# Patient Record
Sex: MALE | Race: WHITE | NOT HISPANIC OR LATINO | URBAN - METROPOLITAN AREA
[De-identification: names, ages, dates, MRNs, and addresses within clinical notes are randomized per-mention and may not be internally consistent; named-entity substitution may affect disease eponyms.]

---

## 2019-06-10 ENCOUNTER — INPATIENT (INPATIENT)
Facility: HOSPITAL | Age: 59
LOS: 3 days | Discharge: HOME | DRG: 166 | End: 2019-06-14
Payer: COMMERCIAL

## 2019-06-10 VITALS
HEART RATE: 91 BPM | DIASTOLIC BLOOD PRESSURE: 88 MMHG | RESPIRATION RATE: 16 BRPM | SYSTOLIC BLOOD PRESSURE: 144 MMHG | OXYGEN SATURATION: 90 % | TEMPERATURE: 100 F

## 2019-06-10 DIAGNOSIS — E66.2 MORBID (SEVERE) OBESITY WITH ALVEOLAR HYPOVENTILATION: ICD-10-CM

## 2019-06-10 DIAGNOSIS — E78.5 HYPERLIPIDEMIA, UNSPECIFIED: ICD-10-CM

## 2019-06-10 DIAGNOSIS — Z99.89 DEPENDENCE ON OTHER ENABLING MACHINES AND DEVICES: ICD-10-CM

## 2019-06-10 DIAGNOSIS — R19.7 DIARRHEA, UNSPECIFIED: ICD-10-CM

## 2019-06-10 DIAGNOSIS — R06.02 SHORTNESS OF BREATH: ICD-10-CM

## 2019-06-10 DIAGNOSIS — M79.602 PAIN IN LEFT ARM: ICD-10-CM

## 2019-06-10 DIAGNOSIS — Z98.890 OTHER SPECIFIED POSTPROCEDURAL STATES: Chronic | ICD-10-CM

## 2019-06-10 DIAGNOSIS — L40.50 ARTHROPATHIC PSORIASIS, UNSPECIFIED: ICD-10-CM

## 2019-06-10 DIAGNOSIS — Z91.89 OTHER SPECIFIED PERSONAL RISK FACTORS, NOT ELSEWHERE CLASSIFIED: ICD-10-CM

## 2019-06-10 DIAGNOSIS — J96.01 ACUTE RESPIRATORY FAILURE WITH HYPOXIA: ICD-10-CM

## 2019-06-10 DIAGNOSIS — G47.33 OBSTRUCTIVE SLEEP APNEA (ADULT) (PEDIATRIC): ICD-10-CM

## 2019-06-10 DIAGNOSIS — K27.9 PEPTIC ULCER, SITE UNSPECIFIED, UNSPECIFIED AS ACUTE OR CHRONIC, WITHOUT HEMORRHAGE OR PERFORATION: ICD-10-CM

## 2019-06-10 DIAGNOSIS — G47.00 INSOMNIA, UNSPECIFIED: ICD-10-CM

## 2019-06-10 DIAGNOSIS — F32.9 MAJOR DEPRESSIVE DISORDER, SINGLE EPISODE, UNSPECIFIED: ICD-10-CM

## 2019-06-10 DIAGNOSIS — Z29.9 ENCOUNTER FOR PROPHYLACTIC MEASURES, UNSPECIFIED: ICD-10-CM

## 2019-06-10 DIAGNOSIS — R63.8 OTHER SYMPTOMS AND SIGNS CONCERNING FOOD AND FLUID INTAKE: ICD-10-CM

## 2019-06-10 DIAGNOSIS — J67.8 HYPERSENSITIVITY PNEUMONITIS DUE TO OTHER ORGANIC DUSTS: ICD-10-CM

## 2019-06-10 DIAGNOSIS — E87.3 ALKALOSIS: ICD-10-CM

## 2019-06-10 DIAGNOSIS — R00.2 PALPITATIONS: ICD-10-CM

## 2019-06-10 DIAGNOSIS — B34.9 VIRAL INFECTION, UNSPECIFIED: ICD-10-CM

## 2019-06-10 LAB
ALBUMIN SERPL ELPH-MCNC: 4.1 G/DL — SIGNIFICANT CHANGE UP (ref 3.3–5)
ALP SERPL-CCNC: 99 U/L — SIGNIFICANT CHANGE UP (ref 40–120)
ALT FLD-CCNC: 56 U/L — HIGH (ref 10–45)
ANION GAP SERPL CALC-SCNC: 13 MMOL/L — SIGNIFICANT CHANGE UP (ref 5–17)
APTT BLD: 30.4 SEC — SIGNIFICANT CHANGE UP (ref 27.5–36.3)
AST SERPL-CCNC: 39 U/L — SIGNIFICANT CHANGE UP (ref 10–40)
BASE EXCESS BLDA CALC-SCNC: 1.8 MMOL/L — SIGNIFICANT CHANGE UP (ref -2–3)
BASOPHILS # BLD AUTO: 0.02 K/UL — SIGNIFICANT CHANGE UP (ref 0–0.2)
BASOPHILS NFR BLD AUTO: 0.4 % — SIGNIFICANT CHANGE UP (ref 0–2)
BILIRUB SERPL-MCNC: 0.7 MG/DL — SIGNIFICANT CHANGE UP (ref 0.2–1.2)
BUN SERPL-MCNC: 12 MG/DL — SIGNIFICANT CHANGE UP (ref 7–23)
CALCIUM SERPL-MCNC: 9 MG/DL — SIGNIFICANT CHANGE UP (ref 8.4–10.5)
CHLORIDE SERPL-SCNC: 103 MMOL/L — SIGNIFICANT CHANGE UP (ref 96–108)
CK MB CFR SERPL CALC: <1 NG/ML — SIGNIFICANT CHANGE UP (ref 0–6.7)
CO2 SERPL-SCNC: 23 MMOL/L — SIGNIFICANT CHANGE UP (ref 22–31)
CREAT SERPL-MCNC: 0.74 MG/DL — SIGNIFICANT CHANGE UP (ref 0.5–1.3)
EOSINOPHIL # BLD AUTO: 0.12 K/UL — SIGNIFICANT CHANGE UP (ref 0–0.5)
EOSINOPHIL NFR BLD AUTO: 2.3 % — SIGNIFICANT CHANGE UP (ref 0–6)
GAS PNL BLDV: SIGNIFICANT CHANGE UP
GLUCOSE SERPL-MCNC: 119 MG/DL — HIGH (ref 70–99)
HCO3 BLDA-SCNC: 25 MMOL/L — SIGNIFICANT CHANGE UP (ref 21–28)
HCT VFR BLD CALC: 44.8 % — SIGNIFICANT CHANGE UP (ref 39–50)
HGB BLD-MCNC: 15.6 G/DL — SIGNIFICANT CHANGE UP (ref 13–17)
IMM GRANULOCYTES NFR BLD AUTO: 0.4 % — SIGNIFICANT CHANGE UP (ref 0–1.5)
INR BLD: 1.13 — SIGNIFICANT CHANGE UP (ref 0.88–1.16)
LEGIONELLA AG UR QL: NEGATIVE — SIGNIFICANT CHANGE UP
LYMPHOCYTES # BLD AUTO: 1.18 K/UL — SIGNIFICANT CHANGE UP (ref 1–3.3)
LYMPHOCYTES # BLD AUTO: 22.6 % — SIGNIFICANT CHANGE UP (ref 13–44)
MCHC RBC-ENTMCNC: 30.5 PG — SIGNIFICANT CHANGE UP (ref 27–34)
MCHC RBC-ENTMCNC: 34.8 GM/DL — SIGNIFICANT CHANGE UP (ref 32–36)
MCV RBC AUTO: 87.7 FL — SIGNIFICANT CHANGE UP (ref 80–100)
MONOCYTES # BLD AUTO: 0.63 K/UL — SIGNIFICANT CHANGE UP (ref 0–0.9)
MONOCYTES NFR BLD AUTO: 12.1 % — SIGNIFICANT CHANGE UP (ref 2–14)
NEUTROPHILS # BLD AUTO: 3.25 K/UL — SIGNIFICANT CHANGE UP (ref 1.8–7.4)
NEUTROPHILS NFR BLD AUTO: 62.2 % — SIGNIFICANT CHANGE UP (ref 43–77)
NRBC # BLD: 0 /100 WBCS — SIGNIFICANT CHANGE UP (ref 0–0)
PCO2 BLDA: 37 MMHG — SIGNIFICANT CHANGE UP (ref 35–48)
PH BLDA: 7.46 — HIGH (ref 7.35–7.45)
PLATELET # BLD AUTO: 176 K/UL — SIGNIFICANT CHANGE UP (ref 150–400)
PO2 BLDA: 67 MMHG — LOW (ref 83–108)
POTASSIUM SERPL-MCNC: 3.7 MMOL/L — SIGNIFICANT CHANGE UP (ref 3.5–5.3)
POTASSIUM SERPL-SCNC: 3.7 MMOL/L — SIGNIFICANT CHANGE UP (ref 3.5–5.3)
PROT SERPL-MCNC: 7.1 G/DL — SIGNIFICANT CHANGE UP (ref 6–8.3)
PROTHROM AB SERPL-ACNC: 12.8 SEC — SIGNIFICANT CHANGE UP (ref 10–12.9)
RAPID RVP RESULT: SIGNIFICANT CHANGE UP
RBC # BLD: 5.11 M/UL — SIGNIFICANT CHANGE UP (ref 4.2–5.8)
RBC # FLD: 12.5 % — SIGNIFICANT CHANGE UP (ref 10.3–14.5)
SAO2 % BLDA: 93 % — LOW (ref 95–100)
SODIUM SERPL-SCNC: 139 MMOL/L — SIGNIFICANT CHANGE UP (ref 135–145)
TROPONIN T SERPL-MCNC: <0.01 NG/ML — SIGNIFICANT CHANGE UP (ref 0–0.01)
TROPONIN T SERPL-MCNC: <0.01 NG/ML — SIGNIFICANT CHANGE UP (ref 0–0.01)
WBC # BLD: 5.22 K/UL — SIGNIFICANT CHANGE UP (ref 3.8–10.5)
WBC # FLD AUTO: 5.22 K/UL — SIGNIFICANT CHANGE UP (ref 3.8–10.5)

## 2019-06-10 PROCEDURE — 71275 CT ANGIOGRAPHY CHEST: CPT | Mod: 26

## 2019-06-10 PROCEDURE — 99223 1ST HOSP IP/OBS HIGH 75: CPT | Mod: GC

## 2019-06-10 PROCEDURE — 71045 X-RAY EXAM CHEST 1 VIEW: CPT | Mod: 26

## 2019-06-10 PROCEDURE — 93010 ELECTROCARDIOGRAM REPORT: CPT

## 2019-06-10 PROCEDURE — 99285 EMERGENCY DEPT VISIT HI MDM: CPT | Mod: 25

## 2019-06-10 RX ORDER — FUROSEMIDE 40 MG
40 TABLET ORAL ONCE
Refills: 0 | Status: COMPLETED | OUTPATIENT
Start: 2019-06-10 | End: 2019-06-10

## 2019-06-10 RX ORDER — LANOLIN ALCOHOL/MO/W.PET/CERES
5 CREAM (GRAM) TOPICAL AT BEDTIME
Refills: 0 | Status: DISCONTINUED | OUTPATIENT
Start: 2019-06-10 | End: 2019-06-14

## 2019-06-10 RX ORDER — IPRATROPIUM/ALBUTEROL SULFATE 18-103MCG
3 AEROSOL WITH ADAPTER (GRAM) INHALATION ONCE
Refills: 0 | Status: COMPLETED | OUTPATIENT
Start: 2019-06-10 | End: 2019-06-10

## 2019-06-10 RX ORDER — SODIUM CHLORIDE 9 MG/ML
1000 INJECTION INTRAMUSCULAR; INTRAVENOUS; SUBCUTANEOUS ONCE
Refills: 0 | Status: COMPLETED | OUTPATIENT
Start: 2019-06-10 | End: 2019-06-10

## 2019-06-10 RX ORDER — TRAZODONE HCL 50 MG
100 TABLET ORAL AT BEDTIME
Refills: 0 | Status: DISCONTINUED | OUTPATIENT
Start: 2019-06-10 | End: 2019-06-10

## 2019-06-10 RX ORDER — HEPARIN SODIUM 5000 [USP'U]/ML
7500 INJECTION INTRAVENOUS; SUBCUTANEOUS EVERY 8 HOURS
Refills: 0 | Status: DISCONTINUED | OUTPATIENT
Start: 2019-06-10 | End: 2019-06-13

## 2019-06-10 RX ADMIN — SODIUM CHLORIDE 1000 MILLILITER(S): 9 INJECTION INTRAMUSCULAR; INTRAVENOUS; SUBCUTANEOUS at 14:14

## 2019-06-10 RX ADMIN — Medication 5 MILLIGRAM(S): at 22:25

## 2019-06-10 RX ADMIN — HEPARIN SODIUM 7500 UNIT(S): 5000 INJECTION INTRAVENOUS; SUBCUTANEOUS at 22:25

## 2019-06-10 RX ADMIN — Medication 40 MILLIGRAM(S): at 14:38

## 2019-06-10 RX ADMIN — SODIUM CHLORIDE 1000 MILLILITER(S): 9 INJECTION INTRAMUSCULAR; INTRAVENOUS; SUBCUTANEOUS at 14:44

## 2019-06-10 RX ADMIN — Medication 3 MILLILITER(S): at 18:33

## 2019-06-10 NOTE — ED ADULT NURSE NOTE - OBJECTIVE STATEMENT
60 y/o M a&ox3 walked in from front triage c/o SOB and tachycardia in 130s. in triage HR to the 90sd. Pt speaking in appropriate sentences, Symmetrical chest rise, BS clear bilaterally. pulse ox 90% on room air, placed on 2 L NC. placed on CCM, NSR. Reports 3 days of SOB and tachycardia, worsening with activity and at night. unable to sleep past 3 nights. 13 hr flight from [facility] 3 days ago. crisis labs called. diaphoretic in appearance. denies chest pain, n/v/d, fevers, chills, cough.

## 2019-06-10 NOTE — ED ADULT NURSE NOTE - CHPI ED NUR SYMPTOMS NEG
no nausea/no syncope/no dizziness/no diaphoresis/no vomiting/no back pain/no chest pain/no chills/no fever/no congestion

## 2019-06-10 NOTE — H&P ADULT - PROBLEM SELECTOR PLAN 5
DVT ppx: HSQ    Dispo: RMF  Code: FULL F: s/p 1L NS in the ED  E: replete PRN  N: DASH/TLC #Diarrhea  Patient reports chronic diarrhea for many years, reports he has loose-watery stools every 1 week or so, never got worked up because he hasn't been able to take stool samples to PMD  - f/u GI PCR  - continue to monitor    ADDENDUM: last episode of loose stools was today, will obtain GI PCR

## 2019-06-10 NOTE — H&P ADULT - NSHPREVIEWOFSYSTEMS_GEN_ALL_CORE
General: no fever, +chills, no fatigue  ENMT: no tinnitus, no nasal discharge, no abnormal taste sensation  Respiratory and Thorax: no wheezing, no dyspnea, no cough  Cardiovascular: no chest pain, +palpitations, +IVAN  Gastrointestinal: no nausea, no vomiting, no diarrhea, no constipation, no change in bowel habits  Genitourinary: no hematuria, no urine discoloration  Musculoskeletal: no arthralgia, no myalgia, no muscle cramps  Neurological: no weakness, +headache, no loss of consciousness  Hematology/Lymphatics: no gum bleeding, no nose bleeding  Endocrine: no enlarged lymph nodes  Allergic/Immunologic: no cold intolerance, no heat intolerance

## 2019-06-10 NOTE — ED PROVIDER NOTE - CLINICAL SUMMARY MEDICAL DECISION MAKING FREE TEXT BOX
58 y/o m no pmh presents c/o shortness of breath with exertion which has worsened in the past 3 days since arriving to [place] from [place]; pt afebrile, HR in 90s, O2 sat goes to 87% on RA, improves to 95% on 3L NC with no tachypnea.  Labs unremarkable, ekg nonischemic, cxr shows pulmonary vascular congestion.  Pt given lasix, has no hx heart failure.  Will get CTA to r/o PE, plan to discuss with cardiology for admission.

## 2019-06-10 NOTE — H&P ADULT - NSHPSOCIALHISTORY_GEN_ALL_CORE
Denies cig smoking  Socially drinks EtOH about 2-3 times a week, about 2 drinks a day, but reports some episodes of binging on some days  Denies illicit drug use  He is an , no occupational exposure  Lives in [place] Denies cig smoking  Socially drinks EtOH about 2-3 times a week, about 2 drinks a day, but reports some episodes of binging (6 beers on weekends) on some days  Denies illicit drug use  He is an , no occupational exposure  Lives in [place]

## 2019-06-10 NOTE — H&P ADULT - PROBLEM SELECTOR PROBLEM 6
Transition of care performed with sharing of clinical summary Prophylactic measure Psoriatic arthritis

## 2019-06-10 NOTE — H&P ADULT - PROBLEM SELECTOR PLAN 2
Likely associated with viral illness. Patient with palpitations, reports HR 140s on fitbit, while having subjective fevers.   -f/u TSH as outpatient, will defer for now given possible acute infection  -CTPA negative for PE  -currently HR 70-90s  -f/u ECHO Likely associated with viral illness. Patient with palpitations, reports HR 140s on fitbit, while having subjective fevers.   -f/u TSH  -CTPA negative for PE  -currently HR 70-90s  -f/u ECHO Likely associated with viral illness. Patient with palpitations, reports HR 140s on fitbit, while having subjective fevers.   -f/u TSH  -CTPA negative for PE  -currently HR 70-90s  -f/u ECHO  -Trops -ve x 1, f/u repeat Trop Likely associated with viral illness. Patient with palpitations, reports HR 140s on his Fitbit, associated with having subjective fevers. PE unlikely given negative imaging; EKG negative for arrhythmia  -f/u TSH  -CTPA negative for PE  -currently HR 70-90s  -f/u ECHO  -Trops -ve x 1, f/u repeat Trop

## 2019-06-10 NOTE — H&P ADULT - NSHPLABSRESULTS_GEN_ALL_CORE
LABS:                        15.6   5.22  )-----------( 176      ( 10 Edy 2019 14:19 )             44.8     06-10    139  |  103  |  12  ----------------------------<  119<H>  3.7   |  23  |  0.74    Ca    9.0      10 Edy 2019 14:19    TPro  7.1  /  Alb  4.1  /  TBili  0.7  /  DBili  x   /  AST  39  /  ALT  56<H>  /  AlkPhos  99  06-10    PT/INR - ( 10 Edy 2019 14:19 )   PT: 12.8 sec;   INR: 1.13          PTT - ( 10 Edy 2019 14:19 )  PTT:30.4 sec    CAPILLARY BLOOD GLUCOSE      RADIOLOGY & ADDITIONAL TESTS:   < from: CT Angio Chest PE Protocol w/ IV Cont (06.10.19 @ 17:28) >    FINDINGS:    CHEST:     CHEST WALL AND LOWER NECK: Within normal limits  MEDIASTINUM AND BLAIR: Within normal limits  HEART: Coronary calcifications.  VESSELS: No pulmonary embolism.  LUNG AND LARGE AIRWAYS: Diffuse groundglass opacities versus air   trapping. Mild interstitial septal thickening. No pleural effusion.   Scattered superimposed air lucencies, unclear if superimposed emphysema.  VISUALIZED UPPER ABDOMEN: Within normal limits.  BONES: Within normal limits.    IMPRESSION: No pulmonary embolism. Diffuse lung findings, unclear if   groundglass opacities or air trapping. Differential includes pulmonary   edema, inflammatory bronchiolitis and interstitial lung disease?NSIP.     Coronary calcifications.    < end of copied text > LABS:                        15.6   5.22  )-----------( 176      ( 10 Edy 2019 14:19 )             44.8     06-10    139  |  103  |  12  ----------------------------<  119<H>  3.7   |  23  |  0.74    Ca    9.0      10 Edy 2019 14:19    TPro  7.1  /  Alb  4.1  /  TBili  0.7  /  DBili  x   /  AST  39  /  ALT  56<H>  /  AlkPhos  99  06-10    PT/INR - ( 10 Edy 2019 14:19 )   PT: 12.8 sec;   INR: 1.13          PTT - ( 10 Edy 2019 14:19 )  PTT:30.4 sec    CAPILLARY BLOOD GLUCOSE      RADIOLOGY & ADDITIONAL TESTS:   CT Angio Chest PE Protocol w/ IV Cont (06.10.19 @ 17:28)    FINDINGS:    CHEST:   CHEST WALL AND LOWER NECK: Within normal limits  MEDIASTINUM AND BLAIR: Within normal limits  HEART: Coronary calcifications.  VESSELS: No pulmonary embolism.  LUNG AND LARGE AIRWAYS: Diffuse ground glass opacities versus air   trapping. Mild interstitial septal thickening. No pleural effusion.   Scattered superimposed air lucencies, unclear if superimposed emphysema.  VISUALIZED UPPER ABDOMEN: Within normal limits.  BONES: Within normal limits.    IMPRESSION: No pulmonary embolism. Diffuse lung findings, unclear if   ground glass opacities or air trapping. Differential includes pulmonary   edema, inflammatory bronchiolitis and interstitial lung disease? NSIP.     Coronary calcifications.

## 2019-06-10 NOTE — H&P ADULT - PROBLEM SELECTOR PLAN 7
1) PCP Contacted on Admission: (Y/N) --> Name & Phone #:  2) Date of Contact with PCP:  3) PCP Contacted at Discharge: (Y/N)  4) Summary of Handoff Given to PCP:   5) Post-Discharge Appointment Date and Location: - c/w home Paroxetine    ADDENDUM: reports using paroxetine intermittently, will hold off currently.   #Insomnia  On home Triazolam 0.125mg qhs  - holding off for now given respiratory status  - will give Melatonin for sleep

## 2019-06-10 NOTE — H&P ADULT - NSICDXPASTMEDICALHX_GEN_ALL_CORE_FT
PAST MEDICAL HISTORY:  Arthritis     Depression     PUD (peptic ulcer disease) PAST MEDICAL HISTORY:  Depression     Psoriatic arthritis reports psoriatic lesions resolved    PUD (peptic ulcer disease)

## 2019-06-10 NOTE — H&P ADULT - HISTORY OF PRESENT ILLNESS
Patient is a 60 yo M with a PMH of depression, Arthritis (was on Naproxen, c/b PUD and was taken off meds), visiting from [place], who presents with acute onset of IVAN x 2 days associated with palpitations and subjective fevers. Patient flew in from [place] about 3 days ago, and started experiencing palpitations ( on Fitbit) with SOB and subjective fever and chills. He reports a hx of SOB and palpitations about 1-1.5 months ago, which self resolved, and at that time patient was in [facility], visiting. He reports that he has had IVAN intermittently since then, worse with exertion like going up a flight of stairs. Patient reports recent sick contacts, his wife had a lung infection and is currently taking antibiotics. Endorses muscle aches on b/l arms, and chronic diarrhea which happens every 2 days. Denies chest pain, abdominal pain, nausea/vomiting, lightheadedness, dizziness.     Upon ED arrival, T 99.8, /88, HR 91, RR 16,  SpO2 90% on RA. In the ED, EKG NSR (HR 93), was breathing comfortably, became diaphoretic. Bibasilar crackles on exam. BNP 64, CTA negative for PE. CXR with b/l infiltrates concerning for vascular congestion. s/p Lasix 40mg IV x 1, NS 1L IV bolus, Nebs x 1. Cardiology was consulted, rec [facility] admission, pt doesn't require tele. Patient is being admitted to [facility] for further workup Patient is a 58 yo M with a PMH of depression, Arthritis (was on Naproxen, c/b PUD and was taken off meds), visiting from [place], who presents with acute onset of IVAN x 2 days associated with palpitations and subjective fevers. Patient flew in from [place] about 3 days ago, and started experiencing palpitations ( on Fitbit) with SOB and subjective fever and chills. He reports a hx of SOB and palpitations about 1-1.5 months ago, which self resolved, and at that time patient was in [facility], visiting. He reports that he has had IVAN intermittently since then, worse with exertion like going up a flight of stairs. Patient reports recent sick contacts, his wife had a lung infection and is currently taking antibiotics. Endorses muscle aches on b/l arms, and chronic diarrhea which happens every 1 week. Reports he snores at night, and wakes up gasping for air for the past 3-4 years. Denies chest pain, abdominal pain, nausea/vomiting, lightheadedness, dizziness.     Upon ED arrival, T 99.8, /88, HR 91, RR 16,  SpO2 90% on RA. In the ED, EKG NSR (HR 93), was breathing comfortably, became diaphoretic. Bibasilar crackles on exam. BNP 64, CTA negative for PE. CXR with b/l infiltrates concerning for vascular congestion. s/p Lasix 40mg IV x 1, NS 1L IV bolus, Nebs x 1. Cardiology was consulted, rec [facility] admission, pt doesn't require tele. Patient is being admitted to [facility] for further workup Patient is a 58 yo M with a PMH of depression, Arthritis (was on Naproxen, c/b PUD and was taken off meds, s/p normal EGD 4 months ago), CUAUHTEMOC (intermittently on CPAP), visiting from [place], who presents with acute onset of IVAN x 2 days associated with palpitations and subjective fevers. Patient flew in from [place] about 3 days ago, and started experiencing palpitations ( on Fitbit) with SOB and subjective fever and chills. He reports a hx of SOB and palpitations about 1-1.5 months ago, which self resolved, and at that time patient was in [facility], visiting. He reports that he has had IVAN intermittently since then, worse with exertion like going up a flight of stairs. Patient reports recent sick contacts, his wife had a lung infection and is currently taking antibiotics. Endorses muscle aches on b/l arms, and chronic diarrhea which happens every 1 week. Reports he snores at night, and wakes up gasping for air for the past 3-4 years. Denies chest pain, abdominal pain, nausea/vomiting, lightheadedness, dizziness.     Upon ED arrival, T 99.8, /88, HR 91, RR 16,  SpO2 90% on RA. In the ED, EKG NSR (HR 93), was breathing comfortably, became diaphoretic. Bibasilar crackles on exam. BNP 64, CTA negative for PE. CXR with b/l infiltrates concerning for vascular congestion. s/p Lasix 40mg IV x 1, NS 1L IV bolus, Nebs x 1. Cardiology was consulted, rec [facility] admission, pt doesn't require tele. Patient is being admitted to [facility] for further workup

## 2019-06-10 NOTE — H&P ADULT - ASSESSMENT
60 yo M with a PMH of depression, who presents with acute onset of IVAN x 2 days associated with palpitations and subjective fevers with recent travels and sick contacts, found to be desatting to 90% on RA, and imaging findings of b/l infiltrates, admitted to [facility] for further workup 60 yo M with a PMH of depression, Arthritis (was on Naproxen, c/b PUD and was taken off meds s/p normal EGD 4 months ago), who presents with acute onset of IVAN x 2 days associated with palpitations and subjective fevers with recent travels and sick contacts, found to be desatting to 90% on RA, and imaging findings of b/l infiltrates, admitted to [facility] for further workup 58 yo M with a PMH of depression, Arthritis (was on Naproxen, c/b PUD and was taken off meds, s/p normal EGD 4 months ago), CUAUHTEMOC (intermittently on CPAP) who presents with acute onset of IVAN x 2 days associated with palpitations and subjective fevers with recent travels and sick contacts, found to be desatting to 90% on RA, and imaging findings of b/l infiltrates, admitted to [facility] for further workup

## 2019-06-10 NOTE — H&P ADULT - NSHPPHYSICALEXAM_GEN_ALL_CORE
VITAL SIGNS:  Vital Signs Last 24 Hrs  T(C): 37.3 (10 Edy 2019 19:10), Max: 37.7 (10 Edy 2019 13:40)  T(F): 99.2 (10 Edy 2019 19:10), Max: 99.8 (10 Edy 2019 13:40)  HR: 72 (10 Edy 2019 17:35) (72 - 91)  BP: 141/89 (10 Edy 2019 17:35) (141/89 - 146/80)  BP(mean): --  RR: 18 (10 Edy 2019 17:35) (16 - 18)  SpO2: 95% (10 Edy 2019 17:35) (90% - 95%)    PHYSICAL EXAM:  General: morbidly obese male, in NAD, sitting up comfortably in bed  HEENT: normocephalic, atraumatic; PERRL, anicteric sclera; MMM; large neck circumference  Neck: supple, no JVD, no thyromegaly, no lymphadenopathy  Cardiovascular: +S1/S2, RRR, no M/G/R  Respiratory: clear to auscultation B/L; no wheezing, no rales, no rhonchi  Gastrointestinal: obese, soft, NT/ND; +BSx4, no organomegaly  Extremities: WWP; no edema, clubbing or cyanosis  Vascular: 2+ radial, DP/PT pulses B/L  Neurological: AAOx3; CN II-XII grossly intact, no focal deficits VITAL SIGNS:  Vital Signs Last 24 Hrs  T(C): 37.3 (10 Edy 2019 19:10), Max: 37.7 (10 Edy 2019 13:40)  T(F): 99.2 (10 Edy 2019 19:10), Max: 99.8 (10 Edy 2019 13:40)  HR: 72 (10 Edy 2019 17:35) (72 - 91)  BP: 141/89 (10 Edy 2019 17:35) (141/89 - 146/80)  BP(mean): --  RR: 18 (10 Edy 2019 17:35) (16 - 18)  SpO2: 95% (10 Edy 2019 17:35) (90% - 95%)    PHYSICAL EXAM:  General: morbidly obese male, in NAD, sitting up comfortably in bed  HEENT: normocephalic, atraumatic; PERRL, anicteric sclera; MMM; large neck circumference  Neck: supple, no JVD, no thyromegaly, no lymphadenopathy  Cardiovascular: +S1/S2, RRR, no M/G/R  Respiratory: bibasilar crackles; no wheezing, no rales, no rhonchi  Gastrointestinal: obese, soft, NT/ND; +BSx4, no organomegaly  Extremities: WWP; no edema, clubbing or cyanosis  Vascular: 2+ radial, DP/PT pulses B/L  Neurological: AAOx3; CN II-XII grossly intact, no focal deficits

## 2019-06-10 NOTE — H&P ADULT - ATTENDING COMMENTS
patient seen and examined; reviewed: H&P, labs, radiology imaging/ reports, EKG   pt a/f further workup of dyspnea, palpitations, found to have ground glass opacities vs air trapping on CT. Pt at bedside reports symptomatic improvement since treatment in ED. Pt w/ CUAUHTEMOC uses CPAP intermittently. Pt reports Left forearm/ bicep pain that is dull and constant for a few weeks but denies chest pain. Pt had a negative exercise stress test last year, family hx significant w/ father and sister having had Myocardial infarctions. Current sxs occuring in setting of travel from [place] w/ sick contact from wife. Reports similar sxs few months ago after [place] in [place] , symptoms lasted 2 weeks then resolved on own. Reports having palpitations, Fit Bit shows HR in 130-140s while lying supine.     relevant/ pertinent PE findings:  awake, alert, NAD, obese, perrla, mildly diaphoretic; s1s2 , bibasilar crackles L>R; abdomen obese but soft and nontender, nondistended; no lower ext edema/ tenderness b/l       1.  2.   3      rest of plan as above patient seen and examined; reviewed: H&P, labs, radiology imaging/ reports, EKG   pt a/f further workup of dyspnea, palpitations, found to have ground glass opacities vs air trapping on CT. Pt at bedside reports symptomatic improvement since treatment in ED. Pt w/ CUAUHTEMOC uses CPAP intermittently. Pt reports Left forearm/ bicep pain that is dull and constant for a few weeks but denies chest pain. Pt had a negative exercise stress test last year, family hx significant w/ father and sister having had Myocardial infarctions. Current sxs occuring in setting of travel from [place] w/ sick contact from wife. Reports similar sxs few months ago after landing in [place] , symptoms lasted 2 weeks then resolved on own. Reports having palpitations, Fit Bit shows HR in 130-140s while lying supine.     relevant/ pertinent PE findings:  awake, alert, NAD, obese, perrla, mildly diaphoretic; s1s2 , bibasilar crackles L>R; abdomen obese but soft and nontender, nondistended; no lower ext edema/ tenderness b/l       1. dyspnea: CT  findings of pulm edema vs bronchiolitis vs ILD; RVP negative; pt on o2 supplementation overnight, pt refused BiPAP per RN.  check ECHO, consult pulm, monitor I/Os , may need additional lasix, followup AM CXR.   2. followup imaging for left arm pain  3. followup GI PCR stool       rest of plan as above

## 2019-06-10 NOTE — H&P ADULT - PROBLEM SELECTOR PLAN 3
- c/w home Paroxetine    #Insomnia  -c/w home Trazodone 100mg qhs - c/w home Paroxetine    #Insomnia  -c/w home Trazodone 100mg qhs    #Diarrhea  Chronic in nature for many years, reports he has loose-watery stools every 2 days or so  - monitor - c/w home Paroxetine    #Insomnia  -c/w home Trazodone 100mg qhs    #Diarrhea  Patient reports chronic diarrhea for many years, reports he has loose-watery stools every 1 week or so, never got worked up because he hasn't been able to take stool samples to PMD  - continue to monitor monitor Patient with a hx of CUAUHTEMOC (intermittently on CPAP) who presents with IVAN and palpitations  - BiPAP overnight  - continue to monitor: will need to f/u as outpatient

## 2019-06-10 NOTE — ED PROVIDER NOTE - PROGRESS NOTE DETAILS
CTA negative for PE, has diffuse lung findings of uncertain etiology which include pulmonary edema, interstitial lung disease, inflammatory bronchiolitis.  Discussed with cardiology fellow who feels pt doesn't need telemetry admission, will admit to medicine for further w/u.

## 2019-06-10 NOTE — H&P ADULT - PROBLEM SELECTOR PLAN 1
Patient with acute onset SOB, mostly with exertion, also with known sick contacts and recent flights, with subjective fevers. Patient found to have b/l lung findings on imaging concerning for pulmonary edema vs infiltrates. DDx includes cardiogenic in nature however patient with unknown CHF hx, although patient with family hx of cardiac dz and similar symptoms of IVAN with stairs and incline over the past month, denies orthopnea or pedal edema. OHS likely given body habitus. Patient more likely with viral PNA given acute onset subjective fevers and known sick contacts  - f/u RVP  - ABG w/ resp alkalosis likely due to hyperventilation from symptoms  - f/u ECHO in the AM  - c/w NC, wean off as tolerated  - s/p Lasix 40mg IV x 1  - f/u lipid panel, A1c  - EKG w/o ischemic changes, Trops -ve x 1  - monitor I/Os Patient with acute onset SOB, mostly with exertion, also with known sick contacts and recent flights, with subjective fevers. Patient found to have b/l lung findings on imaging concerning for pulmonary edema vs infiltrates. DDx includes cardiogenic in nature however patient with unknown CHF hx, although patient with family hx of cardiac dz and similar symptoms of IVAN with stairs and incline over the past month, denies orthopnea or pedal edema. OHS likely given body habitus. Patient more likely with viral PNA given acute onset subjective fevers and known sick contacts  - f/u RVP  -f/u urine legionella Ag given pt with reported hx of diarrhea  - ABG w/ resp alkalosis likely due to hyperventilation from symptoms  - f/u ECHO in the AM  - c/w NC, wean off as tolerated  - s/p Lasix 40mg IV x 1  - EKG w/o ischemic changes, Trops -ve x 1  - monitor strict I/Os  - f/u lipid panel, A1c Patient with acute onset SOB, mostly with exertion, also with known sick contacts and recent flights, with subjective fevers. Patient found to have b/l lung findings on imaging concerning for pulmonary edema vs infiltrates. DDx includes cardiogenic in nature however patient with unknown CHF hx, although patient with family hx of cardiac dz and similar symptoms of IVAN with stairs and incline over the past month, denies orthopnea or pedal edema. OHS likely given body habitus vs CUAUHTEMOC as pt reports snoring at night and waking up gasping for air. Patient more likely with viral PNA given acute onset subjective fevers and known sick contacts  - f/u RVP  -f/u urine legionella Ag given pt with reported hx of diarrhea  - ABG w/ resp alkalosis likely due to hyperventilation from symptoms  - f/u ECHO in the AM  - c/w NC, wean off as tolerated  - BiPAP overnight  - s/p Lasix 40mg IV x 1  - EKG w/o ischemic changes, Trops -ve x 1  - monitor strict I/Os  - f/u lipid panel, A1c    #CUAUHTEMOC  Patient with no known CUAUHTEMOC dx, however reports snoring at night and wakes up gasping for air. CUAUHTEMOC could be contributing to current symptoms  - BiPAP overnight  - will need w/up as outpatient Patient with acute onset SOB, mostly with exertion, also with known sick contacts and recent flights, with subjective fevers. Patient found to have b/l lung findings on imaging concerning for pulmonary edema vs infiltrates. DDx includes cardiogenic in nature however patient with unknown CHF hx, although patient with family hx of cardiac dz and similar symptoms of IVAN with stairs and incline over the past month, denies orthopnea or pedal edema. CUAUHTEMOC also in ddx, given patient with a past diagnosis of CUAUHTEMOC and intermittently on CPAP. Patient more likely with viral PNA given acute onset subjective fevers and known sick contacts  - f/u RVP  -f/u urine legionella Ag given pt with reported hx of diarrhea  - ABG w/ resp alkalosis likely due to hyperventilation from symptoms  - f/u ECHO in the AM  - c/w NC, wean off as tolerated  - BiPAP overnight  - s/p Lasix 40mg IV x 1  - EKG w/o ischemic changes, Trops -ve x 1, f/u repeat  - monitor strict I/Os  - f/u lipid panel, A1c

## 2019-06-10 NOTE — ED PROVIDER NOTE - OBJECTIVE STATEMENT
58 y/o m no significant pmh presents stating has been short of breath intermittently for the past 1.5 months.  Pt stating sx started after a flight to [place], 3 days ago he few from [place] to [place] and since arriving has been feeling shortness of breath which is worse with exertion.  Denies CP, leg swelling, hx DVT/PE, fever, chills, n/v/d, all other ROS negative.

## 2019-06-10 NOTE — H&P ADULT - PROBLEM SELECTOR PLAN 4
F: s/p 1L NS in the ED  E: replete PRN  N: Regular diet F: s/p 1L NS in the ED  E: replete PRN  N: DASH/TLC - c/w home Paroxetine    #Insomnia  On home Triazolam 0.125mg qhs  - holding off for now given respiratory status  - will give Melatonin for sleep      #Diarrhea  Patient reports chronic diarrhea for many years, reports he has loose-watery stools every 1 week or so, never got worked up because he hasn't been able to take stool samples to PMD  - f/u GI PCR  - continue to monitor ADDENDUM: pt reports dull left forearm/ bicep pain for ~3 weeks, nontender to palpation, FROM noted; will obtain imaging studies

## 2019-06-10 NOTE — H&P ADULT - NSICDXPASTSURGICALHX_GEN_ALL_CORE_FT
PAST SURGICAL HISTORY:  H/O eye surgery b/l eyes    H/O shoulder surgery b/l shoulder    H/O wrist surgery R wrist

## 2019-06-10 NOTE — H&P ADULT - PROBLEM SELECTOR PLAN 6
1) PCP Contacted on Admission: (Y/N) --> Name & Phone #:  2) Date of Contact with PCP:  3) PCP Contacted at Discharge: (Y/N)  4) Summary of Handoff Given to PCP:   5) Post-Discharge Appointment Date and Location: DVT ppx: HSQ    Dispo: [facility]  Code: FULL ADDENDUM: reports no longer having psoriatic lesions; re: arthritis ,  off naprosyn 2/2 PUD; sxs controlled, limited to knuckles, wrists and hips b/l; apap prn

## 2019-06-10 NOTE — ED ADULT TRIAGE NOTE - CHIEF COMPLAINT QUOTE
Pt c/o palpitations, sob and LT arm/shoulder pain for a few days - pt  flew in from [place] three days ago , denies any cardiac problems.

## 2019-06-10 NOTE — PATIENT PROFILE ADULT - ABILITY TO HEAR (WITH HEARING AID OR HEARING APPLIANCE IF NORMALLY USED):
Mildly to Moderately Impaired: difficulty hearing in some environments or speaker may need to increase volume or speak distinctly/Bilateral hearing aids (not with patient)

## 2019-06-11 DIAGNOSIS — E78.5 HYPERLIPIDEMIA, UNSPECIFIED: ICD-10-CM

## 2019-06-11 DIAGNOSIS — R00.0 TACHYCARDIA, UNSPECIFIED: ICD-10-CM

## 2019-06-11 LAB
ANION GAP SERPL CALC-SCNC: 11 MMOL/L — SIGNIFICANT CHANGE UP (ref 5–17)
BASOPHILS # BLD AUTO: 0.06 K/UL — SIGNIFICANT CHANGE UP (ref 0–0.2)
BASOPHILS NFR BLD AUTO: 1.8 % — SIGNIFICANT CHANGE UP (ref 0–2)
BUN SERPL-MCNC: 16 MG/DL — SIGNIFICANT CHANGE UP (ref 7–23)
CALCIUM SERPL-MCNC: 9.4 MG/DL — SIGNIFICANT CHANGE UP (ref 8.4–10.5)
CHLORIDE SERPL-SCNC: 104 MMOL/L — SIGNIFICANT CHANGE UP (ref 96–108)
CHOLEST SERPL-MCNC: 211 MG/DL — HIGH (ref 10–199)
CO2 SERPL-SCNC: 26 MMOL/L — SIGNIFICANT CHANGE UP (ref 22–31)
CREAT SERPL-MCNC: 0.73 MG/DL — SIGNIFICANT CHANGE UP (ref 0.5–1.3)
EOSINOPHIL # BLD AUTO: 0.09 K/UL — SIGNIFICANT CHANGE UP (ref 0–0.5)
EOSINOPHIL NFR BLD AUTO: 2.7 % — SIGNIFICANT CHANGE UP (ref 0–6)
GLUCOSE SERPL-MCNC: 122 MG/DL — HIGH (ref 70–99)
HBA1C BLD-MCNC: 5.1 % — SIGNIFICANT CHANGE UP (ref 4–5.6)
HCT VFR BLD CALC: 44.8 % — SIGNIFICANT CHANGE UP (ref 39–50)
HCV AB S/CO SERPL IA: 0.07 S/CO — SIGNIFICANT CHANGE UP
HCV AB SERPL-IMP: SIGNIFICANT CHANGE UP
HDLC SERPL-MCNC: 43 MG/DL — SIGNIFICANT CHANGE UP
HGB BLD-MCNC: 15.2 G/DL — SIGNIFICANT CHANGE UP (ref 13–17)
LIPID PNL WITH DIRECT LDL SERPL: 131 MG/DL — HIGH
LYMPHOCYTES # BLD AUTO: 1.01 K/UL — SIGNIFICANT CHANGE UP (ref 1–3.3)
LYMPHOCYTES # BLD AUTO: 29.5 % — SIGNIFICANT CHANGE UP (ref 13–44)
MAGNESIUM SERPL-MCNC: 1.9 MG/DL — SIGNIFICANT CHANGE UP (ref 1.6–2.6)
MCHC RBC-ENTMCNC: 30.1 PG — SIGNIFICANT CHANGE UP (ref 27–34)
MCHC RBC-ENTMCNC: 33.9 GM/DL — SIGNIFICANT CHANGE UP (ref 32–36)
MCV RBC AUTO: 88.7 FL — SIGNIFICANT CHANGE UP (ref 80–100)
MONOCYTES # BLD AUTO: 0.4 K/UL — SIGNIFICANT CHANGE UP (ref 0–0.9)
MONOCYTES NFR BLD AUTO: 11.6 % — SIGNIFICANT CHANGE UP (ref 2–14)
NEUTROPHILS # BLD AUTO: 1.59 K/UL — LOW (ref 1.8–7.4)
NEUTROPHILS NFR BLD AUTO: 46.4 % — SIGNIFICANT CHANGE UP (ref 43–77)
PLATELET # BLD AUTO: 168 K/UL — SIGNIFICANT CHANGE UP (ref 150–400)
POTASSIUM SERPL-MCNC: 3.5 MMOL/L — SIGNIFICANT CHANGE UP (ref 3.5–5.3)
POTASSIUM SERPL-SCNC: 3.5 MMOL/L — SIGNIFICANT CHANGE UP (ref 3.5–5.3)
RBC # BLD: 5.05 M/UL — SIGNIFICANT CHANGE UP (ref 4.2–5.8)
RBC # FLD: 12.6 % — SIGNIFICANT CHANGE UP (ref 10.3–14.5)
SODIUM SERPL-SCNC: 141 MMOL/L — SIGNIFICANT CHANGE UP (ref 135–145)
TOTAL CHOLESTEROL/HDL RATIO MEASUREMENT: 4.9 RATIO — SIGNIFICANT CHANGE UP (ref 3.4–9.6)
TRIGL SERPL-MCNC: 186 MG/DL — HIGH (ref 10–149)
TSH SERPL-MCNC: 2.69 UIU/ML — SIGNIFICANT CHANGE UP (ref 0.35–4.94)
WBC # BLD: 3.42 K/UL — LOW (ref 3.8–10.5)
WBC # FLD AUTO: 3.42 K/UL — LOW (ref 3.8–10.5)

## 2019-06-11 PROCEDURE — 99253 IP/OBS CNSLTJ NEW/EST LOW 45: CPT

## 2019-06-11 PROCEDURE — 93971 EXTREMITY STUDY: CPT | Mod: 26,LT

## 2019-06-11 PROCEDURE — 93306 TTE W/DOPPLER COMPLETE: CPT | Mod: 26

## 2019-06-11 PROCEDURE — 71250 CT THORAX DX C-: CPT | Mod: 26

## 2019-06-11 PROCEDURE — 99233 SBSQ HOSP IP/OBS HIGH 50: CPT | Mod: GC

## 2019-06-11 PROCEDURE — 73060 X-RAY EXAM OF HUMERUS: CPT | Mod: 26,LT

## 2019-06-11 PROCEDURE — 71045 X-RAY EXAM CHEST 1 VIEW: CPT | Mod: 26

## 2019-06-11 RX ORDER — POTASSIUM CHLORIDE 20 MEQ
40 PACKET (EA) ORAL ONCE
Refills: 0 | Status: COMPLETED | OUTPATIENT
Start: 2019-06-11 | End: 2019-06-11

## 2019-06-11 RX ORDER — TRAZODONE HCL 50 MG
0 TABLET ORAL
Qty: 0 | Refills: 0 | DISCHARGE

## 2019-06-11 RX ADMIN — HEPARIN SODIUM 7500 UNIT(S): 5000 INJECTION INTRAVENOUS; SUBCUTANEOUS at 13:01

## 2019-06-11 RX ADMIN — HEPARIN SODIUM 7500 UNIT(S): 5000 INJECTION INTRAVENOUS; SUBCUTANEOUS at 06:46

## 2019-06-11 RX ADMIN — Medication 40 MILLIEQUIVALENT(S): at 13:03

## 2019-06-11 RX ADMIN — Medication 5 MILLIGRAM(S): at 21:41

## 2019-06-11 RX ADMIN — HEPARIN SODIUM 7500 UNIT(S): 5000 INJECTION INTRAVENOUS; SUBCUTANEOUS at 21:41

## 2019-06-11 RX ADMIN — Medication 20 MILLIGRAM(S): at 13:02

## 2019-06-11 NOTE — PROGRESS NOTE ADULT - PROBLEM SELECTOR PLAN 4
elevated TGs and lipids  ASCVD risk: 9% 10 year risk of adverse event  - will start moderate intensity statin

## 2019-06-11 NOTE — PROGRESS NOTE ADULT - PROBLEM SELECTOR PLAN 2
reports HR 140s on his Fitbit, associated with having subjective fevers. PE unlikely given negative imaging; EKG negative for arrhythmia  -f/u TSH  -CTPA negative for PE  -currently HR 70-90s  -f/u ECHO  -Trops -ve x 1, f/u repeat Trop

## 2019-06-11 NOTE — PROGRESS NOTE ADULT - PROBLEM SELECTOR PLAN 1
Patient with acute onset SOB, mostly with exertion, also with known sick contacts and recent flights, with subjective fevers. Patient found to have b/l lung findings on imaging concerning for pulmonary edema vs infiltrates vs interstitial lung disease. DDx includes cardiogenic in nature however patient with no CHF or CAD (patient w reported negative stress test 1month ago. CUAUHTEMOC also in ddx, given patient with a past diagnosis of CUAUHTEMOC and intermittently on CPAP. Also pulm HTN vs ILD vs NSIP given hx of psoriatic arthritis  - RVP negative, urine legionella Ag negative  - ABG w/ resp alkalosis likely due to hyperventilation from symptoms  - echo with mild LVH, EF 60-65%, unable to determine PASP  - c/w NC, wean off as tolerated  - cpap overnight  - s/p Lasix 40mg IV x1 in ED. holding off on more diuresis given negative BNP and lack of other findings of overload on exam  - EKG w/o ischemic changes, Trops -ve x 2  - monitor strict I/Os  - pulm consulted - will likely perform bronchoscopy tomorrow  - f/u CT chest with inspiration and expiration  - f/u full PFTs  - f/u autoimmune labs

## 2019-06-11 NOTE — PROGRESS NOTE ADULT - PROBLEM SELECTOR PLAN 3
Patient with a hx of CUAUHTEMOC (intermittently on CPAP) who presents with IVAN and palpitations  - BiPAP overnight  - continue to monitor: will need to f/u as outpatient

## 2019-06-11 NOTE — CONSULT NOTE ADULT - PROBLEM SELECTOR RECOMMENDATION 2
- No active disease noted, but may be associated with ILD as mentioned above  - Collagen vascular markers pending

## 2019-06-11 NOTE — CONSULT NOTE ADULT - PROBLEM SELECTOR RECOMMENDATION 9
- Clinical picture seems more consistent with underlying lung disease process that has been undiagnosed and acutely worsened from a trigger  - His symptoms of rigoring/subjective fever suggest underlying infection, though it resolved in < 24 hrs and he currently has no infectious symptoms but hypoxia has persisted and RVP negative  - He desaturated to 80% after ambulating 100 ft, resolved with rest and nasal cannula  - The diffuse ground glass opacity pattern with areas of mosaicism does not fit specific criteria for underlying ILD, differential diagnosis would include NSIP, HP, organizing pneumonia; less likely eosinophilic pneumonia. While the radiographic picture is consistent with pulmonary edema, he has no evidence of volume overload, BNP is normal,  and echo with mild LVH, but normal EF and no evidence of PH.   - NSIP is highest on the differential given his history of psoriatic arthritis (rare, but associated with ILD in 2% of cases). Follow up collagen vascular disease markers.  - Given his significant desaturation, would obtain CT of chest with inspiration and expiration to better view the distribution and severity of the ground glass opacities and mosaicism  - Will plan for bronchoscopy with BAL tomorrow - if concerning for NSIP, will consider initiating steroids in order to optimize pt to be able to fly without difficulty and return home for further work up  - Please make NPO after midnight - Clinical picture seems more consistent with underlying lung disease process that has been undiagnosed and acutely worsened from a trigger  - His symptoms of rigoring/subjective fever suggest underlying infection, though it resolved in < 24 hrs and he currently has no infectious symptoms but hypoxia has persisted and RVP negative  - He desaturated to 80% after ambulating 100 ft, resolved with rest and nasal cannula  - The diffuse ground glass opacity pattern with areas of mosaicism does not fit specific criteria for underlying ILD, differential diagnosis would include NSIP, HP, organizing pneumonia; less likely eosinophilic pneumonia. While the radiographic picture is consistent with pulmonary edema, he has no evidence of volume overload, BNP is normal,  and echo with mild LVH, but normal EF and no evidence of PH.   - NSIP is highest on the differential given his history of psoriatic arthritis (rare, but associated with ILD in 2% of cases). Follow up collagen vascular disease markers.  - Given his significant desaturation, would obtain CT of chest with inspiration and expiration to better view the distribution and severity of the ground glass opacities and mosaicism  - Obtain full PFTs to assess for restrictive lung disease  - Will plan for bronchoscopy with BAL tomorrow - if concerning for NSIP, will consider initiating steroids in order to optimize pt to be able to fly without difficulty and return home for further work up  - Please make NPO after midnight

## 2019-06-11 NOTE — CONSULT NOTE ADULT - ASSESSMENT
58 yo M with PMH psoriatic arthritis, CUAUHTEMOC, presents with acute on chronic IVAN, palpitations, associated with hypoxia and diffuse ground glass opacities on CT

## 2019-06-11 NOTE — CONSULT NOTE ADULT - SUBJECTIVE AND OBJECTIVE BOX
Patient is a 59y old  Male who presents with a chief complaint of IVAN and palpitations (10 Edy 2019 19:23)    HPI: 60 yo M with PMH psoriatic arthritis (currently on no treatment, previously on naproxen), CUAUHTEMOC on CPAP with poor compliance, presents with acute onset of SOB. Pt is visiting from [place] and states he has noted over the last few months intermittent episodes of SOB with exertion and elevated HR to 140's that resolves with rests. Would not occur daily and just a few weeks ago was able [place], but admits he has been more tired with his usual exertion than previously. He flew from [facility] to [place] this past weekend and stayed up late his first night drinking. The next day he felt generally unwell which he attributed to being hung over, but later on in the evening had an episode when his HR was 140 and he was diaphoretic/warm with generalized shaking (? rigors and fever?). He had no other associated sx including cough or sputum production, chest pain, congestion, nausea/vomiting,  difficulty urinating, weight loss or weight gain. He was able to fly from [place] to [facility] the next day, but has still been noticing significant SOB with exertion. While attempting to walk around the museum yesterday he once again became SOB and decided to come to the ED when his HR was 140s.     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 Was adding Listerine to the water container in his CPAP machine to help with the "taste", but he has not used the CPAP in 3 months. No family hx of lung disease, father had CAD.        PAST MEDICAL & SURGICAL HISTORY:  Psoriatic arthritis: reports psoriatic lesions resolved  PUD (peptic ulcer disease)  Depression  H/O eye surgery: b/l eyes  H/O wrist surgery: R wrist  H/O shoulder surgery: b/l shoulder      FAMILY HISTORY:  Family history of cervical cancer: Mother  Family history of diabetes mellitus (DM): Dad and sister  FH: MI (myocardial infarction): Dad at age 61, Sister at age 63      SOCIAL HISTORY:  Smoking Status: [ ] Current, [ ] Former, [ ] Never  Pack Years: 0    MEDICATIONS:  Pulmonary:    Antimicrobials:    Anticoagulants:  heparin  Injectable 7500 Unit(s) SubCutaneous every 8 hours    Onc:    GI/:    Endocrine:    Cardiac:    Other Medications:  melatonin 5 milliGRAM(s) Oral at bedtime  PARoxetine 20 milliGRAM(s) Oral daily  potassium chloride    Tablet ER 40 milliEquivalent(s) Oral once      Allergies    No Known Allergies    Intolerances        Vital Signs Last 24 Hrs  T(C): 37.1 (11 Jun 2019 09:05), Max: 37.7 (10 Edy 2019 13:40)  T(F): 98.7 (11 Jun 2019 09:05), Max: 99.8 (10 Edy 2019 13:40)  HR: 62 (11 Jun 2019 09:08) (56 - 91)  BP: 125/72 (11 Jun 2019 09:05) (1/- - 146/80)  BP(mean): --  RR: 18 (11 Jun 2019 09:08) (16 - 19)  SpO2: 96% (11 Jun 2019 09:08) (90% - 96%)    06-10 @ 07:01  -  06-11 @ 07:00  --------------------------------------------------------  IN: 0 mL / OUT: 602 mL / NET: -602 mL    Physical exam  General - NAD, standing up next to bed comfortable on room air  HEENT - MMM, Mallampati IV, EOMI  Neck - no JVD, no cervical lymphadenopathy  Resp - CTA B/L with no W/R/R  Cardiac - S1S2 RRR no M/R/G  Abd - soft, nondistended, nontender  Ext - no C/C/E  Skin - WWP  Neuro - no focal deficits      LABS:  ABG - ( 10 Edy 2019 20:00 )  pH, Arterial: 7.46  pH, Blood: x     /  pCO2: 37    /  pO2: 67    / HCO3: 25    / Base Excess: 1.8   /  SaO2: 93                  CBC Full  -  ( 11 Jun 2019 06:57 )  WBC Count : 3.42 K/uL  RBC Count : 5.05 M/uL  Hemoglobin : 15.2 g/dL  Hematocrit : 44.8 %  Platelet Count - Automated : 168 K/uL  Mean Cell Volume : 88.7 fl  Mean Cell Hemoglobin : 30.1 pg  Mean Cell Hemoglobin Concentration : 33.9 gm/dL  Auto Neutrophil # : 1.59 K/uL  Auto Lymphocyte # : 1.01 K/uL  Auto Monocyte # : 0.40 K/uL  Auto Eosinophil # : 0.09 K/uL  Auto Basophil # : 0.06 K/uL  Auto Neutrophil % : 46.4 %  Auto Lymphocyte % : 29.5 %  Auto Monocyte % : 11.6 %  Auto Eosinophil % : 2.7 %  Auto Basophil % : 1.8 %    06-11    141  |  104  |  16  ----------------------------<  122<H>  3.5   |  26  |  0.73    Ca    9.4      11 Jun 2019 06:57  Mg     1.9     06-11    TPro  7.1  /  Alb  4.1  /  TBili  0.7  /  DBili  x   /  AST  39  /  ALT  56<H>  /  AlkPhos  99  06-10    PT/INR - ( 10 Edy 2019 14:19 )   PT: 12.8 sec;   INR: 1.13          PTT - ( 10 Edy 2019 14:19 )  PTT:30.4 sec                  RADIOLOGY & ADDITIONAL STUDIES (The following images were personally reviewed):  < from: CT Angio Chest PE Protocol w/ IV Cont (06.10.19 @ 17:28) >  IMPRESSION: No pulmonary embolism. Diffuse lung findings, unclear if   groundglass opacities or air trapping. Differential includes pulmonary   edema, inflammatory bronchiolitis and interstitial lung disease?NSIP.     Coronary calcifications.    < end of copied text >

## 2019-06-11 NOTE — CONSULT NOTE ADULT - PROBLEM SELECTOR RECOMMENDATION 3
- noncompliant with CPAP at night, but has episodes of awakening at night gasping for air. Would benefit from usage, continue to encourage nightly use.

## 2019-06-11 NOTE — PROGRESS NOTE ADULT - ATTENDING COMMENTS
Patient was seen and examined with the resident team today.  I agree with Dr. Morales's assessment and plan with the following exceptions/additions:     Briefly, this is a 60yo gentleman with a PMH of morbid obesity, psoriatic arthritis (not on disease modifying drugs), PUD 2/2 previous chronic NSAID-use and OHS/CUAUHTEMOC (intermittently on CPAP) who p/w acute onset IVAN, palpitations and fevers while visiting from [place] and subsequently found to have acute hypoxic respiratory failure (pO2 57mmHg) with GGO on chest CT of unclear etiology (TTE unremarkable).  Notably desatted to 80's on supplemental O2 while ambulating.  Now awaiting bronchoscopy and CT-ILD/pneumonitis work-up.     -- Pulm consult appreciated  -- f/u outstanding serologies  -- repeat chest CT w/inspiratory and expiratory phases  -- PFTs  --- DVT PPx – SQH  -- Dipso – back to  pending resolution of hypoxia     Shelly Egan  [phone]

## 2019-06-12 ENCOUNTER — RESULT REVIEW (OUTPATIENT)
Age: 59
End: 2019-06-12

## 2019-06-12 LAB
ALBUMIN SERPL ELPH-MCNC: 3.8 G/DL — SIGNIFICANT CHANGE UP (ref 3.3–5)
ALP SERPL-CCNC: 84 U/L — SIGNIFICANT CHANGE UP (ref 40–120)
ALT FLD-CCNC: 39 U/L — SIGNIFICANT CHANGE UP (ref 10–45)
ANION GAP SERPL CALC-SCNC: 9 MMOL/L — SIGNIFICANT CHANGE UP (ref 5–17)
APTT BLD: 31.9 SEC — SIGNIFICANT CHANGE UP (ref 27.5–36.3)
AST SERPL-CCNC: 22 U/L — SIGNIFICANT CHANGE UP (ref 10–40)
BILIRUB SERPL-MCNC: 0.7 MG/DL — SIGNIFICANT CHANGE UP (ref 0.2–1.2)
BUN SERPL-MCNC: 16 MG/DL — SIGNIFICANT CHANGE UP (ref 7–23)
CALCIUM SERPL-MCNC: 9.3 MG/DL — SIGNIFICANT CHANGE UP (ref 8.4–10.5)
CHLORIDE SERPL-SCNC: 105 MMOL/L — SIGNIFICANT CHANGE UP (ref 96–108)
CO2 SERPL-SCNC: 28 MMOL/L — SIGNIFICANT CHANGE UP (ref 22–31)
CREAT SERPL-MCNC: 0.78 MG/DL — SIGNIFICANT CHANGE UP (ref 0.5–1.3)
GLUCOSE SERPL-MCNC: 108 MG/DL — HIGH (ref 70–99)
HCT VFR BLD CALC: 42.8 % — SIGNIFICANT CHANGE UP (ref 39–50)
HGB BLD-MCNC: 14.4 G/DL — SIGNIFICANT CHANGE UP (ref 13–17)
INR BLD: 1.04 — SIGNIFICANT CHANGE UP (ref 0.88–1.16)
MAGNESIUM SERPL-MCNC: 2 MG/DL — SIGNIFICANT CHANGE UP (ref 1.6–2.6)
MCHC RBC-ENTMCNC: 30.2 PG — SIGNIFICANT CHANGE UP (ref 27–34)
MCHC RBC-ENTMCNC: 33.6 GM/DL — SIGNIFICANT CHANGE UP (ref 32–36)
MCV RBC AUTO: 89.7 FL — SIGNIFICANT CHANGE UP (ref 80–100)
NRBC # BLD: 0 /100 WBCS — SIGNIFICANT CHANGE UP (ref 0–0)
PLATELET # BLD AUTO: 185 K/UL — SIGNIFICANT CHANGE UP (ref 150–400)
POTASSIUM SERPL-MCNC: 3.8 MMOL/L — SIGNIFICANT CHANGE UP (ref 3.5–5.3)
POTASSIUM SERPL-SCNC: 3.8 MMOL/L — SIGNIFICANT CHANGE UP (ref 3.5–5.3)
PROT SERPL-MCNC: 6.5 G/DL — SIGNIFICANT CHANGE UP (ref 6–8.3)
PROTHROM AB SERPL-ACNC: 11.8 SEC — SIGNIFICANT CHANGE UP (ref 10–12.9)
RBC # BLD: 4.77 M/UL — SIGNIFICANT CHANGE UP (ref 4.2–5.8)
RBC # FLD: 12.7 % — SIGNIFICANT CHANGE UP (ref 10.3–14.5)
SODIUM SERPL-SCNC: 142 MMOL/L — SIGNIFICANT CHANGE UP (ref 135–145)
WBC # BLD: 2.67 K/UL — LOW (ref 3.8–10.5)
WBC # FLD AUTO: 2.67 K/UL — LOW (ref 3.8–10.5)

## 2019-06-12 PROCEDURE — 99232 SBSQ HOSP IP/OBS MODERATE 35: CPT | Mod: 25

## 2019-06-12 PROCEDURE — 31624 DX BRONCHOSCOPE/LAVAGE: CPT

## 2019-06-12 PROCEDURE — 99233 SBSQ HOSP IP/OBS HIGH 50: CPT | Mod: GC

## 2019-06-12 RX ORDER — ATORVASTATIN CALCIUM 80 MG/1
10 TABLET, FILM COATED ORAL AT BEDTIME
Refills: 0 | Status: DISCONTINUED | OUTPATIENT
Start: 2019-06-12 | End: 2019-06-14

## 2019-06-12 RX ADMIN — HEPARIN SODIUM 7500 UNIT(S): 5000 INJECTION INTRAVENOUS; SUBCUTANEOUS at 13:50

## 2019-06-12 RX ADMIN — HEPARIN SODIUM 7500 UNIT(S): 5000 INJECTION INTRAVENOUS; SUBCUTANEOUS at 21:27

## 2019-06-12 RX ADMIN — Medication 5 MILLIGRAM(S): at 21:26

## 2019-06-12 RX ADMIN — Medication 20 MILLIGRAM(S): at 11:45

## 2019-06-12 RX ADMIN — HEPARIN SODIUM 7500 UNIT(S): 5000 INJECTION INTRAVENOUS; SUBCUTANEOUS at 05:39

## 2019-06-12 RX ADMIN — ATORVASTATIN CALCIUM 10 MILLIGRAM(S): 80 TABLET, FILM COATED ORAL at 21:26

## 2019-06-12 NOTE — PHYSICAL THERAPY INITIAL EVALUATION ADULT - PERTINENT HX OF CURRENT PROBLEM, REHAB EVAL
Patient is a 60 yo M with a PMH of depression, Arthritis (was on Naproxen, c/b PUD and was taken off meds, s/p normal EGD 4 months ago), CUAUHTEMOC (intermittently on CPAP), visiting from [place], who presents with acute onset of IVAN x 2 days associated with palpitations and subjective fevers. Patient flew in from [place] about 3 days ago, and started experiencing palpitations ( on Fitbit) with SOB and subjective fever and chills.

## 2019-06-12 NOTE — PROGRESS NOTE ADULT - PROBLEM SELECTOR PLAN 4
elevated TGs and lipids  ASCVD risk: 9% 10 year risk of adverse event  - will recommend moderate intensity statin

## 2019-06-12 NOTE — PROGRESS NOTE ADULT - PROBLEM SELECTOR PLAN 3
Patient with a hx of CUAUHTEMOC (intermittently on CPAP) who presents with IVAN and palpitations  - BiPAP overnight  - continue to monitor: will need to f/u as outpatient in [place]

## 2019-06-12 NOTE — PROGRESS NOTE ADULT - PROBLEM SELECTOR PLAN 1
- CTA of chest concerning for underlying ILD, insp/exp chest imaging with significant improvement in ground glass opacitiesi and mosaicism on inspiratory films, exaggeration of ground glass/lucent lung fields on expiratory films. Suggest air trapping, but this alone would not explain significant desaturation.  - Upper lobe predominant mild ground glass opacities and peribronchovascular changes lower likelihood of NSIP and increase likelihood of HP or . No exposure history consistent with HP. CVD panel pending. Team to check HIV test.   - Plan for bronchoscopy today to assess BAL - if lymphocyte predominant, more consistent with HP or NSIP, would likely benefit from steroids. If neutrophil predominant, may be 2/2 viral bronchiolitis/infectious etiology  - Ambulate patient with pulse ox - if continues to desaturate, will need high altitude oxygen testing and may require oxygen on his flights.

## 2019-06-12 NOTE — PHYSICAL THERAPY INITIAL EVALUATION ADULT - ADDITIONAL COMMENTS
Pt reports visiting [place] with wife and currently staying in hotel. Pt lives with wife in [place]. Neither hotel or home in [place] have stairs to navigate.

## 2019-06-12 NOTE — PROGRESS NOTE ADULT - PROBLEM SELECTOR PLAN 1
Patient with acute onset SOB, mostly with exertion, also with known sick contacts and recent flights, with subjective fevers. Patient found to have b/l lung findings on imaging concerning for pulmonary edema vs infiltrates vs interstitial lung disease. DDx includes cardiogenic in nature however patient with no CHF or CAD (patient w reported negative stress test 1month ago. CUAUHTEMOC also in ddx, given patient with a past diagnosis of CUAUHTEMOC and intermittently on CPAP. Also pulm HTN vs ILD vs NSIP given hx of psoriatic arthritis  - RVP negative, urine legionella Ag negative  - ABG w/ resp alkalosis likely due to hyperventilation from symptoms  - echo with mild LVH, EF 60-65%, unable to determine PASP  - c/w NC, wean off as tolerated  - cpap overnight  - pulm consulted - plan for bronch today  - CT chest with inspiration and expiration  - f/u full PFTs  - f/u autoimmune labs - thus far negative  - f/u HIV if patient agrees

## 2019-06-12 NOTE — PROGRESS NOTE ADULT - PROBLEM SELECTOR PLAN 2
reports HR 140s on his Fitbit, associated with having subjective fevers. PE unlikely given negative imaging; EKG negative for arrhythmia. Likely secondary to hypoxia due to lung pathology  - TSH WNL  - CTPA negative for PE  - currently HR 70-90s  - ECHO w normal EF, mild LVH reports HR 140s on his Fitbit, associated with having subjective fevers. PE unlikely given negative imaging; EKG negative for arrhythmia. Likely secondary to hypoxia due to lung pathology  - TSH WNL  - CTPA negative for PE  - currently HR 70-90s  - ECHO w normal EF, mild LVH  - will recommend holter monitor after discharge

## 2019-06-12 NOTE — CHART NOTE - NSCHARTNOTEFT_GEN_A_CORE
Pt s/p flexible bronchoscopy with BAL of lingula. Tolerated procedure well with no complications. Minimal sedation used to avoid hypoxia. Moderate amount of thin clear secretions noted and suctioned. Will f/u cell count w/diff and cytology + micro

## 2019-06-12 NOTE — PHYSICAL THERAPY INITIAL EVALUATION ADULT - MODALITIES TREATMENT COMMENTS
Pt was able to accomplish entire session without supplemental oxygen and dropped as low to 90% SpO2.

## 2019-06-12 NOTE — PROGRESS NOTE ADULT - ATTENDING COMMENTS
Patient was seen and examined with the resident team today.  I agree with Dr. Morales's assessment and plan with the following exceptions/additions:     Briefly, this is a 58yo gentleman with a PMH of morbid obesity, psoriatic arthritis (not on disease modifying drugs), PUD 2/2 previous chronic NSAID-use and OHS/CUAUHTEMOC (intermittently on CPAP) who p/w acute onset IVAN, palpitations and fevers while visiting from [place] and subsequently found to have acute hypoxic respiratory failure (pO2 57mmHg) with GGO on chest CT of unclear etiology (TTE unremarkable).  Notably desaturated to 80's on supplemental O2 while ambulating.  Now awaiting bronchoscopy and CT-ILD/pneumonitis work-up.  NAEO; subjectively feels better this AM but awoken a couple times last night.  VS - SpO 88% upon ambulation on 3L (better than day prior).  Exam - NCAT, MMM, clear OP, supple neck, CTA B w/no w/r/r, RRR no m/g/r, obese abdomen +BS soft NTND, WWP no c/c/e, AOx3, pleasant/conversant/appropriate. Labs - WBC 2 (downtrending), RF negative.  CT chest reviewed by me.      -- Bronch today   -- f/u outstanding serologies  -- PFTs  -- Pulm consult appreciated  -- will continue to try to ambulate  -- started on moderate-intensity statin; should be on a fenofibrate (can defer to PMD in [facility])  -- should be referred to a holter monitor (can defer to PMD in [place])   --- DVT PPx – SQH  -- Dipso – back to  pending resolution of hypoxia     Shelly Egan  [phone]

## 2019-06-13 ENCOUNTER — TRANSCRIPTION ENCOUNTER (OUTPATIENT)
Age: 59
End: 2019-06-13

## 2019-06-13 LAB
ANION GAP SERPL CALC-SCNC: 9 MMOL/L — SIGNIFICANT CHANGE UP (ref 5–17)
BASOPHILS # BLD AUTO: 0.02 K/UL — SIGNIFICANT CHANGE UP (ref 0–0.2)
BASOPHILS NFR BLD AUTO: 0.5 % — SIGNIFICANT CHANGE UP (ref 0–2)
BUN SERPL-MCNC: 15 MG/DL — SIGNIFICANT CHANGE UP (ref 7–23)
CALCIUM SERPL-MCNC: 9.3 MG/DL — SIGNIFICANT CHANGE UP (ref 8.4–10.5)
CHLORIDE SERPL-SCNC: 102 MMOL/L — SIGNIFICANT CHANGE UP (ref 96–108)
CO2 SERPL-SCNC: 29 MMOL/L — SIGNIFICANT CHANGE UP (ref 22–31)
CREAT SERPL-MCNC: 0.91 MG/DL — SIGNIFICANT CHANGE UP (ref 0.5–1.3)
EOSINOPHIL # BLD AUTO: 0.12 K/UL — SIGNIFICANT CHANGE UP (ref 0–0.5)
EOSINOPHIL NFR BLD AUTO: 3.2 % — SIGNIFICANT CHANGE UP (ref 0–6)
GLUCOSE SERPL-MCNC: 98 MG/DL — SIGNIFICANT CHANGE UP (ref 70–99)
HCT VFR BLD CALC: 44.3 % — SIGNIFICANT CHANGE UP (ref 39–50)
HGB BLD-MCNC: 14.7 G/DL — SIGNIFICANT CHANGE UP (ref 13–17)
IMM GRANULOCYTES NFR BLD AUTO: 0.3 % — SIGNIFICANT CHANGE UP (ref 0–1.5)
LYMPHOCYTES # BLD AUTO: 1.66 K/UL — SIGNIFICANT CHANGE UP (ref 1–3.3)
LYMPHOCYTES # BLD AUTO: 44.7 % — HIGH (ref 13–44)
MAGNESIUM SERPL-MCNC: 2.2 MG/DL — SIGNIFICANT CHANGE UP (ref 1.6–2.6)
MCHC RBC-ENTMCNC: 29.8 PG — SIGNIFICANT CHANGE UP (ref 27–34)
MCHC RBC-ENTMCNC: 33.2 GM/DL — SIGNIFICANT CHANGE UP (ref 32–36)
MCV RBC AUTO: 89.7 FL — SIGNIFICANT CHANGE UP (ref 80–100)
MONOCYTES # BLD AUTO: 0.47 K/UL — SIGNIFICANT CHANGE UP (ref 0–0.9)
MONOCYTES NFR BLD AUTO: 12.7 % — SIGNIFICANT CHANGE UP (ref 2–14)
NEUTROPHILS # BLD AUTO: 1.43 K/UL — LOW (ref 1.8–7.4)
NEUTROPHILS NFR BLD AUTO: 38.6 % — LOW (ref 43–77)
NRBC # BLD: 0 /100 WBCS — SIGNIFICANT CHANGE UP (ref 0–0)
PLATELET # BLD AUTO: 198 K/UL — SIGNIFICANT CHANGE UP (ref 150–400)
POTASSIUM SERPL-MCNC: 4.2 MMOL/L — SIGNIFICANT CHANGE UP (ref 3.5–5.3)
POTASSIUM SERPL-SCNC: 4.2 MMOL/L — SIGNIFICANT CHANGE UP (ref 3.5–5.3)
RBC # BLD: 4.94 M/UL — SIGNIFICANT CHANGE UP (ref 4.2–5.8)
RBC # FLD: 12.4 % — SIGNIFICANT CHANGE UP (ref 10.3–14.5)
SODIUM SERPL-SCNC: 140 MMOL/L — SIGNIFICANT CHANGE UP (ref 135–145)
WBC # BLD: 3.71 K/UL — LOW (ref 3.8–10.5)
WBC # FLD AUTO: 3.71 K/UL — LOW (ref 3.8–10.5)

## 2019-06-13 PROCEDURE — 99232 SBSQ HOSP IP/OBS MODERATE 35: CPT

## 2019-06-13 PROCEDURE — 99233 SBSQ HOSP IP/OBS HIGH 50: CPT | Mod: GC

## 2019-06-13 RX ORDER — BENZOCAINE AND MENTHOL 5; 1 G/100ML; G/100ML
1 LIQUID ORAL THREE TIMES A DAY
Refills: 0 | Status: DISCONTINUED | OUTPATIENT
Start: 2019-06-13 | End: 2019-06-14

## 2019-06-13 RX ADMIN — HEPARIN SODIUM 7500 UNIT(S): 5000 INJECTION INTRAVENOUS; SUBCUTANEOUS at 06:38

## 2019-06-13 RX ADMIN — HEPARIN SODIUM 7500 UNIT(S): 5000 INJECTION INTRAVENOUS; SUBCUTANEOUS at 13:44

## 2019-06-13 RX ADMIN — Medication 20 MILLIGRAM(S): at 11:11

## 2019-06-13 RX ADMIN — Medication 5 MILLIGRAM(S): at 21:30

## 2019-06-13 RX ADMIN — ATORVASTATIN CALCIUM 10 MILLIGRAM(S): 80 TABLET, FILM COATED ORAL at 21:30

## 2019-06-13 RX ADMIN — Medication 60 MILLIGRAM(S): at 11:11

## 2019-06-13 NOTE — PROGRESS NOTE ADULT - ATTENDING COMMENTS
Patient was seen and examined with the resident team today.  I agree with Dr. Morales's assessment and plan with the following exceptions/additions:     Briefly, this is a 58yo gentleman with a PMH of morbid obesity, psoriatic arthritis (not on disease modifying drugs), PUD 2/2 previous chronic NSAID-use and OHS/CUAUHTEMOC (intermittently on CPAP) who p/w acute onset IVAN, palpitations and fevers while visiting from [place] and subsequently found to have acute hypoxic respiratory failure (pO2 57mmHg) with GGO on chest CT of unclear etiology (TTE unremarkable).  Notably desaturated to 80's on supplemental O2 while ambulating.  Now awaiting bronchoscopy and CT-ILD/pneumonitis work-up.      -- f/u bronch studies from 6/12  -- f/u outstanding serologies  -- PFTs pending  -- Pulm consult appreciated  -- started on moderate-intensity statin; should be on a fenofibrate (can defer to PMD in [facility])  -- should be referred to a holter monitor (can defer to PMD in [place])   --- DVT PPx – SQH  -- Dipso – pending discussion with Pulm, potentially today    Shelly Egan  [phone] Patient was seen and examined with the resident team today.  I agree with Dr. Morales's assessment and plan with the following exceptions/additions:     Briefly, this is a 60yo gentleman with a PMH of morbid obesity, psoriatic arthritis (not on disease modifying drugs), PUD 2/2 previous chronic NSAID-use and OHS/CUAUHTEMOC (intermittently on CPAP) who p/w acute onset IVAN, palpitations and fevers while visiting from [place] and subsequently found to have acute hypoxic respiratory failure (pO2 57mmHg) with GGO on chest CT of unclear etiology (TTE unremarkable).  Notably desaturated to 80's on supplemental O2 while ambulating.  Now awaiting bronchoscopy and CT-ILD/pneumonitis work-up.      -- needs PFTs and high altitude testing today   -- f/u bronch studies from 6/12  -- f/u outstanding serologies  -- PFTs pending  -- Pulm consult appreciated  -- started on moderate-intensity statin; should be on a fenofibrate (can defer to PMD in [facility])  -- should be referred to a holter monitor (can defer to PMD in [place])   --- DVT PPx – SQH  -- Dipso – pending pulm studies, maybe tomorrow    Shelly Egan  [phone]

## 2019-06-13 NOTE — PROGRESS NOTE ADULT - PROBLEM SELECTOR PLAN 5
resolved  - xray negative and US doppler negative for DVT

## 2019-06-13 NOTE — PROGRESS NOTE ADULT - PROBLEM SELECTOR PLAN 1
Patient with acute onset SOB, mostly with exertion, also with known sick contacts and recent flights, with subjective fevers. Patient found to have b/l lung findings on imaging concerning for pulmonary edema vs infiltrates vs interstitial lung disease. DDx includes cardiogenic in nature however patient with no CHF or CAD (patient w reported negative stress test 1month ago. CUAUHTEMOC also in ddx, given patient with a past diagnosis of CUAUHTEMOC and intermittently on CPAP. Also pulm HTN vs ILD vs NSIP given hx of psoriatic arthritis  - RVP negative, urine legionella Ag negative  - ABG w/ resp alkalosis likely due to hyperventilation from symptoms  - echo with mild LVH, EF 60-65%, unable to determine PASP  - c/w NC, wean off as tolerated  - cpap overnight  - pulm consulted - plan for bronch today  - CT chest with inspiration and expiration  - f/u full PFTs  - f/u autoimmune labs - thus far negative  - f/u HIV if patient agrees Patient with acute onset SOB, mostly with exertion, also with known sick contacts and recent flights, with subjective fevers. Patient found to have b/l lung findings on imaging concerning for pulmonary edema vs infiltrates vs interstitial lung disease. DDx includes cardiogenic in nature however patient with no CHF or CAD (patient w reported negative stress test 1month ago. CUAUHTEMOC also in ddx, given patient with a past diagnosis of CUAUHTEMOC and intermittently on CPAP. Also ILD vs NSIP given hx of psoriatic arthritis.   - s/p bronch which showed lymphocytes suggestive of ILD, which is consistent w CT chest findings, so will start prednisone 60mg PO QD   - RVP negative, urine legionella Ag negative  - ABG w/ resp alkalosis likely due to hyperventilation from symptoms  - echo with mild LVH, EF 60-65%, unable to determine PASP - r/o pulm HTN  - c/w NC, wean off as tolerated  - cpap overnight  - pulm consulted - appreciate recs  - f/u full PFTs and high altitude PFTs  - patient still desatting on ambulation to 88%  - autoimmune labs - negative

## 2019-06-13 NOTE — PROGRESS NOTE ADULT - PROBLEM SELECTOR PLAN 8
F: none  E: replete PRN  N: DASH/TLC

## 2019-06-13 NOTE — DISCHARGE NOTE PROVIDER - NSDCQMSTAIRS_GEN_ALL_CORE
Cardiology Cardiology Cardiology Cardiology Electrophysiology Hospitalist Hospitalist Hospitalist Hospitalist Pulmonology Pulmonology Electrophysiology Hospitalist No

## 2019-06-13 NOTE — PROGRESS NOTE ADULT - PROBLEM SELECTOR PLAN 3
Patient with a hx of CUAUHTEMOC (intermittently on CPAP) who presents with IVAN and palpitations  - BiPAP overnight  - continue to monitor: will need to f/u as outpatient in [place] Patient with a hx of CUAUHTEMOC (intermittently on CPAP) who presents with IVAN and palpitations  - CPAP overnight  - continue to monitor: will need to f/u as outpatient in [place]

## 2019-06-13 NOTE — PROGRESS NOTE ADULT - PROBLEM SELECTOR PLAN 10
1) PCP Contacted on Admission: (Y/N) --> Name & Phone #: from [facility]  2) Date of Contact with PCP:  3) PCP Contacted at Discharge: (Y/N)  4) Summary of Handoff Given to PCP:   5) Post-Discharge Appointment Date and Location:
1) PCP Contacted on Admission: (Y/N) --> Name & Phone #: from [facility]  2) Date of Contact with PCP:  3) PCP Contacted at Discharge: (Y/N)  4) Summary of Handoff Given to PCP:   5) Post-Discharge Appointment Date and Location:
1) PCP Contacted on Admission: (Y/N) --> Name & Phone #: from [facility]  2) Date of Contact with PCP:  3) PCP Contacted at Discharge: (Y/N)  4) Summary of Handoff Given to PCP:   5) Post-Discharge Appointment Date and Location:

## 2019-06-13 NOTE — PROGRESS NOTE ADULT - PROBLEM SELECTOR PLAN 2
reports HR 140s on his Fitbit, associated with having subjective fevers. PE unlikely given negative imaging; EKG negative for arrhythmia. Likely secondary to hypoxia due to lung pathology  - TSH WNL  - CTPA negative for PE  - currently HR 70-90s  - ECHO w normal EF, mild LVH  - will recommend holter monitor after discharge reports HR 140s on his Fitbit, associated with having subjective fevers. PE unlikely given negative imaging; EKG negative for arrhythmia. Likely secondary to hypoxia due to lung pathology  - TSH WNL  - CTPA negative for PE  - currently HR 70-90s  - ECHO w normal EF, mild LVH  - will recommend holter/loop recorder monitor after discharge

## 2019-06-13 NOTE — DISCHARGE NOTE PROVIDER - NSDCCPCAREPLAN_GEN_ALL_CORE_FT
PRINCIPAL DISCHARGE DIAGNOSIS  Diagnosis: Shortness of breath  Assessment and Plan of Treatment: You came to the hospital because of shortness of breath for which you required oxygen. Your imaging findings were consistent with interstitial lung disease. You were started on a steroid called prednisone, which you should continue to take going forward. Please follow up with a pulmonologist in .      SECONDARY DISCHARGE DIAGNOSES  Diagnosis: Hyperlipidemia, unspecified hyperlipidemia type  Assessment and Plan of Treatment: Your lipid levels were measured as elevated, therefore you were started on a medication called lipitor (also known as atorvastatin) in order to help lower your levels.    Diagnosis: CUAUHTEMOC (obstructive sleep apnea)  Assessment and Plan of Treatment: You have a history of CUAUHTEMOC for which you use a cpap intermittenly. Please do your best to use this overnight as untreated CUAUHTEMOC can cause serious health issues.    Diagnosis: Fast heart beat  Assessment and Plan of Treatment: You reported that you occasionally notice a fast heart beat on your fitbit. You should follow up with your primary care doctor or a cardiologist in  to consider placing a heart monitor for further workup. PRINCIPAL DISCHARGE DIAGNOSIS  Diagnosis: Shortness of breath  Assessment and Plan of Treatment: You came to the hospital because of shortness of breath for which you required oxygen. Your imaging findings were consistent with interstitial lung disease. You were started on a steroid called prednisone, which you should continue to take going forward. Please follow up with Dr. Austin on 6/19/19 at 9:40am.      SECONDARY DISCHARGE DIAGNOSES  Diagnosis: Hyperlipidemia, unspecified hyperlipidemia type  Assessment and Plan of Treatment: Your lipid levels were measured as elevated, therefore you were started on a medication called lipitor (also known as atorvastatin) in order to help lower your levels.    Diagnosis: CUAUHTEMOC (obstructive sleep apnea)  Assessment and Plan of Treatment: You have a history of CUAUHTEMOC for which you use a cpap intermittenly. Please do your best to use this overnight as untreated CUAUHTEMOC can cause serious health issues.    Diagnosis: Fast heart beat  Assessment and Plan of Treatment: You reported that you occasionally notice a fast heart beat on your fitbit. You should follow up with your primary care doctor or a cardiologist in  to consider placing a heart monitor for further workup.

## 2019-06-13 NOTE — PROGRESS NOTE ADULT - PROBLEM SELECTOR PLAN 1
- upper lobe predominant ground glass opacities with lymphocytic predominant BAL most consistent with HP  - Will send HP panel tomorrow AM  - Start prednisone 60mg and maintain on current dose until follow up with pulmonologist in [facility]  - High alt oxygen testing prior to discharge

## 2019-06-13 NOTE — PROGRESS NOTE ADULT - PROBLEM SELECTOR PLAN 7
- c/w home Paroxetine 20mg QD    #Insomnia  On home Triazolam 0.125mg qhs  - holding off for now given respiratory status  - will give Melatonin for sleep

## 2019-06-13 NOTE — PROGRESS NOTE ADULT - PROBLEM SELECTOR PLAN 4
elevated TGs and lipids  ASCVD risk: 9% 10 year risk of adverse event  - will recommend moderate intensity statin elevated TGs and lipids  ASCVD risk: 9% 10 year risk of adverse event  - started lipitor 10mg QHS

## 2019-06-13 NOTE — PROGRESS NOTE ADULT - PROBLEM SELECTOR PLAN 6
reports no longer having psoriatic lesions but does have arthritis,  off naprosyn 2/2 PUD; sxs controlled, limited to knuckles, wrists and hips b/l  - could predispose to lung pathology

## 2019-06-13 NOTE — DISCHARGE NOTE PROVIDER - CARE PROVIDER_API CALL
Raiza Austin)  Critical Care Medicine; Pulmonary Disease  [address] [place] [address]  Phone: [phone]  Fax: [phone]  Follow Up Time:

## 2019-06-13 NOTE — DISCHARGE NOTE PROVIDER - HOSPITAL COURSE
60 yo M with PMH psoriatic arthritis (currently on no treatment, previously on naproxen), CUAUHTEMOC on CPAP with poor compliance, presents with acute onset of SOB. Pt is visiting from [place] and states he has noted over the last few months intermittent episodes of SOB with exertion and elevated HR to 140's that resolves with rest. Patient was noted to be hypoxic in the ED to 90 on RA. O2sat improved with nasal canula. CT scan showed diffuse ground glass opacities. Patient underwent bronchoscopy with pulmonology with BAL that had lymphocytic predominance, concerning for ILD of unclear etiology. Throughout stay, patient's rales on exam improved as did O2sat on ambulation. Patient was started on prednisone 60mg. He also underwent PFTs and high altitude oxygen testing. 60 yo M with PMH psoriatic arthritis (currently on no treatment, previously on naproxen), CUAUHTEMOC on CPAP with poor compliance, presents with acute onset of SOB. Pt is visiting from [place] and states he has noted over the last few months intermittent episodes of SOB with exertion and elevated HR to 140's that resolves with rest. Patient was noted to be hypoxic in the ED to 90 on RA. O2sat improved with nasal canula. CT scan showed diffuse ground glass opacities. Patient underwent bronchoscopy with pulmonology with BAL that had lymphocytic predominance, concerning for ILD of unclear etiology. Throughout stay, patient's rales on exam improved as did O2sat on ambulation. Patient was started on prednisone 60mg. He also underwent PFTs and high altitude oxygen testing and it was determined that patient is stable to fly and does not require oxygen upon discharge. He will follow up with Dr. Austin of pulmonology next week.

## 2019-06-13 NOTE — PROGRESS NOTE ADULT - PROBLEM SELECTOR PLAN 2
- 2% associated with ILD, normal ESR and CRP mildly elevated, doubt exacerbation of collagen vascular disease; collagen vascular panel pending

## 2019-06-13 NOTE — PROGRESS NOTE ADULT - PROBLEM SELECTOR PLAN 9
DVT ppx: HSQ    Dispo: [facility]F  Code: FULL

## 2019-06-14 ENCOUNTER — TRANSCRIPTION ENCOUNTER (OUTPATIENT)
Age: 59
End: 2019-06-14

## 2019-06-14 ENCOUNTER — OTHER (OUTPATIENT)
Age: 59
End: 2019-06-14

## 2019-06-14 VITALS — RESPIRATION RATE: 16 BRPM | OXYGEN SATURATION: 95 % | HEART RATE: 68 BPM

## 2019-06-14 PROBLEM — K27.9 PEPTIC ULCER, SITE UNSPECIFIED, UNSPECIFIED AS ACUTE OR CHRONIC, WITHOUT HEMORRHAGE OR PERFORATION: Chronic | Status: ACTIVE | Noted: 2019-06-10

## 2019-06-14 PROBLEM — F32.9 MAJOR DEPRESSIVE DISORDER, SINGLE EPISODE, UNSPECIFIED: Chronic | Status: ACTIVE | Noted: 2019-06-10

## 2019-06-14 PROBLEM — L40.50 ARTHROPATHIC PSORIASIS, UNSPECIFIED: Chronic | Status: ACTIVE | Noted: 2019-06-10

## 2019-06-14 PROBLEM — Z00.00 ENCOUNTER FOR PREVENTIVE HEALTH EXAMINATION: Status: ACTIVE | Noted: 2019-06-14

## 2019-06-14 LAB
ANION GAP SERPL CALC-SCNC: 9 MMOL/L — SIGNIFICANT CHANGE UP (ref 5–17)
BASOPHILS # BLD AUTO: 0.02 K/UL — SIGNIFICANT CHANGE UP (ref 0–0.2)
BASOPHILS NFR BLD AUTO: 0.4 % — SIGNIFICANT CHANGE UP (ref 0–2)
BUN SERPL-MCNC: 16 MG/DL — SIGNIFICANT CHANGE UP (ref 7–23)
CALCIUM SERPL-MCNC: 9 MG/DL — SIGNIFICANT CHANGE UP (ref 8.4–10.5)
CHLORIDE SERPL-SCNC: 104 MMOL/L — SIGNIFICANT CHANGE UP (ref 96–108)
CO2 SERPL-SCNC: 25 MMOL/L — SIGNIFICANT CHANGE UP (ref 22–31)
CREAT SERPL-MCNC: 0.68 MG/DL — SIGNIFICANT CHANGE UP (ref 0.5–1.3)
EOSINOPHIL # BLD AUTO: 0.07 K/UL — SIGNIFICANT CHANGE UP (ref 0–0.5)
EOSINOPHIL NFR BLD AUTO: 1.5 % — SIGNIFICANT CHANGE UP (ref 0–6)
GLUCOSE SERPL-MCNC: 114 MG/DL — HIGH (ref 70–99)
HCT VFR BLD CALC: 43.9 % — SIGNIFICANT CHANGE UP (ref 39–50)
HGB BLD-MCNC: 14.7 G/DL — SIGNIFICANT CHANGE UP (ref 13–17)
IMM GRANULOCYTES NFR BLD AUTO: 0.4 % — SIGNIFICANT CHANGE UP (ref 0–1.5)
LYMPHOCYTES # BLD AUTO: 1.45 K/UL — SIGNIFICANT CHANGE UP (ref 1–3.3)
LYMPHOCYTES # BLD AUTO: 30.2 % — SIGNIFICANT CHANGE UP (ref 13–44)
MCHC RBC-ENTMCNC: 29.6 PG — SIGNIFICANT CHANGE UP (ref 27–34)
MCHC RBC-ENTMCNC: 33.5 GM/DL — SIGNIFICANT CHANGE UP (ref 32–36)
MCV RBC AUTO: 88.3 FL — SIGNIFICANT CHANGE UP (ref 80–100)
MONOCYTES # BLD AUTO: 0.49 K/UL — SIGNIFICANT CHANGE UP (ref 0–0.9)
MONOCYTES NFR BLD AUTO: 10.2 % — SIGNIFICANT CHANGE UP (ref 2–14)
NEUTROPHILS # BLD AUTO: 2.75 K/UL — SIGNIFICANT CHANGE UP (ref 1.8–7.4)
NEUTROPHILS NFR BLD AUTO: 57.3 % — SIGNIFICANT CHANGE UP (ref 43–77)
NON-GYNECOLOGICAL CYTOLOGY STUDY: SIGNIFICANT CHANGE UP
NRBC # BLD: 0 /100 WBCS — SIGNIFICANT CHANGE UP (ref 0–0)
PLATELET # BLD AUTO: 216 K/UL — SIGNIFICANT CHANGE UP (ref 150–400)
POTASSIUM SERPL-MCNC: 3.5 MMOL/L — SIGNIFICANT CHANGE UP (ref 3.5–5.3)
POTASSIUM SERPL-SCNC: 3.5 MMOL/L — SIGNIFICANT CHANGE UP (ref 3.5–5.3)
RBC # BLD: 4.97 M/UL — SIGNIFICANT CHANGE UP (ref 4.2–5.8)
RBC # FLD: 12.1 % — SIGNIFICANT CHANGE UP (ref 10.3–14.5)
SODIUM SERPL-SCNC: 138 MMOL/L — SIGNIFICANT CHANGE UP (ref 135–145)
WBC # BLD: 4.8 K/UL — SIGNIFICANT CHANGE UP (ref 3.8–10.5)
WBC # FLD AUTO: 4.8 K/UL — SIGNIFICANT CHANGE UP (ref 3.8–10.5)

## 2019-06-14 PROCEDURE — 86001 ALLERGEN SPECIFIC IGG: CPT

## 2019-06-14 PROCEDURE — 96374 THER/PROPH/DIAG INJ IV PUSH: CPT | Mod: XU

## 2019-06-14 PROCEDURE — 93005 ELECTROCARDIOGRAM TRACING: CPT

## 2019-06-14 PROCEDURE — 85730 THROMBOPLASTIN TIME PARTIAL: CPT

## 2019-06-14 PROCEDURE — 84443 ASSAY THYROID STIM HORMONE: CPT

## 2019-06-14 PROCEDURE — 87486 CHLMYD PNEUM DNA AMP PROBE: CPT

## 2019-06-14 PROCEDURE — 86431 RHEUMATOID FACTOR QUANT: CPT

## 2019-06-14 PROCEDURE — 82803 BLOOD GASES ANY COMBINATION: CPT

## 2019-06-14 PROCEDURE — 94010 BREATHING CAPACITY TEST: CPT | Mod: 26

## 2019-06-14 PROCEDURE — 94729 DIFFUSING CAPACITY: CPT | Mod: 26

## 2019-06-14 PROCEDURE — 94726 PLETHYSMOGRAPHY LUNG VOLUMES: CPT | Mod: 26

## 2019-06-14 PROCEDURE — 86803 HEPATITIS C AB TEST: CPT

## 2019-06-14 PROCEDURE — 94452 HAST W/I&R PHYS/QHP: CPT

## 2019-06-14 PROCEDURE — 99285 EMERGENCY DEPT VISIT HI MDM: CPT | Mod: 25

## 2019-06-14 PROCEDURE — 94452 HAST W/I&R PHYS/QHP: CPT | Mod: 26

## 2019-06-14 PROCEDURE — 87449 NOS EACH ORGANISM AG IA: CPT

## 2019-06-14 PROCEDURE — 94760 N-INVAS EAR/PLS OXIMETRY 1: CPT

## 2019-06-14 PROCEDURE — 36415 COLL VENOUS BLD VENIPUNCTURE: CPT

## 2019-06-14 PROCEDURE — 94060 EVALUATION OF WHEEZING: CPT

## 2019-06-14 PROCEDURE — 94726 PLETHYSMOGRAPHY LUNG VOLUMES: CPT

## 2019-06-14 PROCEDURE — 86235 NUCLEAR ANTIGEN ANTIBODY: CPT

## 2019-06-14 PROCEDURE — 84132 ASSAY OF SERUM POTASSIUM: CPT

## 2019-06-14 PROCEDURE — 99239 HOSP IP/OBS DSCHRG MGMT >30: CPT | Mod: GC

## 2019-06-14 PROCEDURE — 84295 ASSAY OF SERUM SODIUM: CPT

## 2019-06-14 PROCEDURE — 83735 ASSAY OF MAGNESIUM: CPT

## 2019-06-14 PROCEDURE — 87581 M.PNEUMON DNA AMP PROBE: CPT

## 2019-06-14 PROCEDURE — 71250 CT THORAX DX C-: CPT

## 2019-06-14 PROCEDURE — 87206 SMEAR FLUORESCENT/ACID STAI: CPT

## 2019-06-14 PROCEDURE — 87798 DETECT AGENT NOS DNA AMP: CPT

## 2019-06-14 PROCEDURE — 93971 EXTREMITY STUDY: CPT

## 2019-06-14 PROCEDURE — 94640 AIRWAY INHALATION TREATMENT: CPT

## 2019-06-14 PROCEDURE — 80048 BASIC METABOLIC PNL TOTAL CA: CPT

## 2019-06-14 PROCEDURE — 86140 C-REACTIVE PROTEIN: CPT

## 2019-06-14 PROCEDURE — 86225 DNA ANTIBODY NATIVE: CPT

## 2019-06-14 PROCEDURE — 73060 X-RAY EXAM OF HUMERUS: CPT

## 2019-06-14 PROCEDURE — 83880 ASSAY OF NATRIURETIC PEPTIDE: CPT

## 2019-06-14 PROCEDURE — 88305 TISSUE EXAM BY PATHOLOGIST: CPT

## 2019-06-14 PROCEDURE — 87116 MYCOBACTERIA CULTURE: CPT

## 2019-06-14 PROCEDURE — 89051 BODY FLUID CELL COUNT: CPT

## 2019-06-14 PROCEDURE — 87184 SC STD DISK METHOD PER PLATE: CPT

## 2019-06-14 PROCEDURE — 85610 PROTHROMBIN TIME: CPT

## 2019-06-14 PROCEDURE — 87015 SPECIMEN INFECT AGNT CONCNTJ: CPT

## 2019-06-14 PROCEDURE — 85027 COMPLETE CBC AUTOMATED: CPT

## 2019-06-14 PROCEDURE — 94729 DIFFUSING CAPACITY: CPT

## 2019-06-14 PROCEDURE — 82330 ASSAY OF CALCIUM: CPT

## 2019-06-14 PROCEDURE — 99232 SBSQ HOSP IP/OBS MODERATE 35: CPT

## 2019-06-14 PROCEDURE — 80061 LIPID PANEL: CPT

## 2019-06-14 PROCEDURE — 93306 TTE W/DOPPLER COMPLETE: CPT

## 2019-06-14 PROCEDURE — 86038 ANTINUCLEAR ANTIBODIES: CPT

## 2019-06-14 PROCEDURE — 94660 CPAP INITIATION&MGMT: CPT

## 2019-06-14 PROCEDURE — 85652 RBC SED RATE AUTOMATED: CPT

## 2019-06-14 PROCEDURE — 84484 ASSAY OF TROPONIN QUANT: CPT

## 2019-06-14 PROCEDURE — 88112 CYTOPATH CELL ENHANCE TECH: CPT

## 2019-06-14 PROCEDURE — 71045 X-RAY EXAM CHEST 1 VIEW: CPT

## 2019-06-14 PROCEDURE — 87070 CULTURE OTHR SPECIMN AEROBIC: CPT

## 2019-06-14 PROCEDURE — 80053 COMPREHEN METABOLIC PANEL: CPT

## 2019-06-14 PROCEDURE — 82553 CREATINE MB FRACTION: CPT

## 2019-06-14 PROCEDURE — 85025 COMPLETE CBC W/AUTO DIFF WBC: CPT

## 2019-06-14 PROCEDURE — 71275 CT ANGIOGRAPHY CHEST: CPT

## 2019-06-14 PROCEDURE — 87252 VIRUS INOCULATION TISSUE: CPT

## 2019-06-14 PROCEDURE — 87102 FUNGUS ISOLATION CULTURE: CPT

## 2019-06-14 PROCEDURE — 97161 PT EVAL LOW COMPLEX 20 MIN: CPT

## 2019-06-14 PROCEDURE — 87633 RESP VIRUS 12-25 TARGETS: CPT

## 2019-06-14 PROCEDURE — 87389 HIV-1 AG W/HIV-1&-2 AB AG IA: CPT

## 2019-06-14 PROCEDURE — 86200 CCP ANTIBODY: CPT

## 2019-06-14 PROCEDURE — 83036 HEMOGLOBIN GLYCOSYLATED A1C: CPT

## 2019-06-14 PROCEDURE — 82550 ASSAY OF CK (CPK): CPT

## 2019-06-14 RX ORDER — ATORVASTATIN CALCIUM 80 MG/1
1 TABLET, FILM COATED ORAL
Qty: 30 | Refills: 0
Start: 2019-06-14 | End: 2019-07-13

## 2019-06-14 RX ORDER — PANTOPRAZOLE SODIUM 20 MG/1
1 TABLET, DELAYED RELEASE ORAL
Qty: 7 | Refills: 0
Start: 2019-06-14 | End: 2019-06-20

## 2019-06-14 RX ADMIN — Medication 60 MILLIGRAM(S): at 06:42

## 2019-06-14 RX ADMIN — Medication 20 MILLIGRAM(S): at 08:50

## 2019-06-14 NOTE — DIETITIAN INITIAL EVALUATION ADULT. - PROBLEM SELECTOR PLAN 1
Patient with acute onset SOB, mostly with exertion, also with known sick contacts and recent flights, with subjective fevers. Patient found to have b/l lung findings on imaging concerning for pulmonary edema vs infiltrates. DDx includes cardiogenic in nature however patient with unknown CHF hx, although patient with family hx of cardiac dz and similar symptoms of IVAN with stairs and incline over the past month, denies orthopnea or pedal edema. CUAUHTEMOC also in ddx, given patient with a past diagnosis of CUAUHTEMOC and intermittently on CPAP. Patient more likely with viral PNA given acute onset subjective fevers and known sick contacts  - f/u RVP  -f/u urine legionella Ag given pt with reported hx of diarrhea  - ABG w/ resp alkalosis likely due to hyperventilation from symptoms  - f/u ECHO in the AM  - c/w NC, wean off as tolerated  - BiPAP overnight  - s/p Lasix 40mg IV x 1  - EKG w/o ischemic changes, Trops -ve x 1, f/u repeat  - monitor strict I/Os  - f/u lipid panel, A1c

## 2019-06-14 NOTE — PROGRESS NOTE ADULT - PROBLEM SELECTOR PROBLEM 2
Fast heart beat
Psoriatic arthritis
Fast heart beat
Fast heart beat
Psoriatic arthritis
Psoriatic arthritis

## 2019-06-14 NOTE — DIETITIAN INITIAL EVALUATION ADULT. - PROBLEM SELECTOR PLAN 7
- c/w home Paroxetine    ADDENDUM: reports using paroxetine intermittently, will hold off currently.   #Insomnia  On home Triazolam 0.125mg qhs  - holding off for now given respiratory status  - will give Melatonin for sleep

## 2019-06-14 NOTE — DIETITIAN INITIAL EVALUATION ADULT. - NS AS NUTRI INTERV ED CONTENT
encouraged intake through day/ balancing meals with fruit, vegetables, protein, whole grains/Purpose of the nutrition education

## 2019-06-14 NOTE — DISCHARGE NOTE NURSING/CASE MANAGEMENT/SOCIAL WORK - NSDCDPATPORTLINK_GEN_ALL_CORE
You can access the [facility] Patient Portal, offered by [facility], by registering with the following website: [email or web address]

## 2019-06-14 NOTE — PROGRESS NOTE ADULT - REASON FOR ADMISSION
IVAN and palpitations

## 2019-06-14 NOTE — DIETITIAN INITIAL EVALUATION ADULT. - PROBLEM SELECTOR PLAN 4
ADDENDUM: pt reports dull left forearm/ bicep pain for ~3 weeks, nontender to palpation, FROM noted; will obtain imaging studies

## 2019-06-14 NOTE — DIETITIAN INITIAL EVALUATION ADULT. - PROBLEM/PLAN-8
p/t is 20 weeks pregnant c/o of abd discomfort, denies any vag issues,  p/t seen in another ED for same issue and was  told "baby needs to be aborted"
DISPLAY PLAN FREE TEXT

## 2019-06-14 NOTE — CHART NOTE - NSCHARTNOTEFT_GEN_A_CORE
Upon Nutritional Assessment by the Registered Dietitian your patient was determined to meet criteria / has evidence of the following diagnosis/diagnoses:          [ ]  Mild Protein Calorie Malnutrition        [ ]  Moderate Protein Calorie Malnutrition        [ ] Severe Protein Calorie Malnutrition        [ ] Unspecified Protein Calorie Malnutrition        [ ] Underweight / BMI <19        [x ] Morbid Obesity / BMI > 40    BMI 43.4     Findings as based on:  •  Comprehensive nutrition assessment and consultation    Treatment:    The following diet has been recommended:      PROVIDER Section:     By signing this assessment you are acknowledging and agree with the diagnosis/diagnoses assigned by the Registered Dietitian    Comments:

## 2019-06-14 NOTE — PROGRESS NOTE ADULT - PROBLEM SELECTOR PLAN 1
- upper lobe predominant ground glass opacities with lymphocytic predominant BAL (63%) most consistent with HP, also supported by the borderline restrictive PFTs and mildly decreased DLCO  - Has history of gastritis, will be on prednisone, recommend nexium to prevent recurrent gastritis  - Likely 2/2 mold exposure in his house. Pt has been instructed in strict detail that bathroom will need to have professional cleaning to remove the mold and he is at risk of having exacerbation of symptoms until the exposure is removed.   - Maintain on 60mg of prednisone, will follow up with Dr. Austin next week to determine if he should continue high dose or taper. Would prescribe 20 days of prednisone to supply him until he returns to  and is able to see a pulmonologist.   - High alt oxygen testing demonstrated no desaturation, cleared to fly without oxygen supplementation.

## 2019-06-14 NOTE — PROGRESS NOTE ADULT - SUBJECTIVE AND OBJECTIVE BOX
OVERNIGHT EVENTS: QUIN    SUBJECTIVE: Patient feels well overnight. No feeling of rapid heart beat. Reports breathing well on nasal canula.    Vital Signs Last 12 Hrs  T(F): 98.7 (06-11-19 @ 09:05), Max: 98.7 (06-11-19 @ 09:05)  HR: 62 (06-11-19 @ 09:08) (56 - 65)  BP: 125/72 (06-11-19 @ 09:05) (1/- - 125/72)  BP(mean): --  RR: 18 (06-11-19 @ 09:08) (16 - 18)  SpO2: 96% (06-11-19 @ 09:08) (93% - 96%)  I&O's Summary    10 Edy 2019 07:01  -  11 Jun 2019 07:00  --------------------------------------------------------  IN: 0 mL / OUT: 602 mL / NET: -602 mL        PHYSICAL EXAM:  Constitutional: NAD, comfortable in bed.  HEENT: NC/AT, PERRLA, EOMI, no conjunctival pallor or scleral icterus, MMM  Neck: Supple, no JVD  Respiratory: Normal rate, rhythm, depth, effort. on nasal cannula. Rales at bases and mid lung R>L  Cardiovascular: RRR, normal S1 and S2, no m/r/g.   Gastrointestinal: +BS, soft NTND, no guarding or rebound tenderness, no palpable masses   Extremities: wwp; no cyanosis, clubbing or edema.   Vascular: Pulses equal and strong throughout.   Neurological: AAOx3, no CN deficits, strength and sensation intact throughout.   Skin: No gross skin abnormalities or rashes        LABS:                        15.2   3.42  )-----------( 168      ( 11 Jun 2019 06:57 )             44.8     06-11    141  |  104  |  16  ----------------------------<  122<H>  3.5   |  26  |  0.73    Ca    9.4      11 Jun 2019 06:57  Mg     1.9     06-11    TPro  7.1  /  Alb  4.1  /  TBili  0.7  /  DBili  x   /  AST  39  /  ALT  56<H>  /  AlkPhos  99  06-10    PT/INR - ( 10 Edy 2019 14:19 )   PT: 12.8 sec;   INR: 1.13          PTT - ( 10 Edy 2019 14:19 )  PTT:30.4 sec      RADIOLOGY & ADDITIONAL TESTS:    MEDICATIONS  (STANDING):  heparin  Injectable 7500 Unit(s) SubCutaneous every 8 hours  melatonin 5 milliGRAM(s) Oral at bedtime  PARoxetine 20 milliGRAM(s) Oral daily    MEDICATIONS  (PRN):
Patient seen and examined at bedside. After further discussion with pt and wife re: exposure history, it was revealed that they have been having recurrent mold in their bathroom ("pink" mold). The pt had been having more frequent baths during the winter time and the respiratory symptoms seem to fit the timeline highly suggestive of mold induced hypersensitivity pneumonitis.         MEDICATIONS:  Pulmonary:    Antimicrobials:    Anticoagulants:    Cardiac:      Allergies    No Known Allergies    Intolerances        Vital Signs Last 24 Hrs  T(C): 36.4 (14 Jun 2019 11:54), Max: 37.1 (13 Jun 2019 16:13)  T(F): 97.6 (14 Jun 2019 11:54), Max: 98.8 (13 Jun 2019 16:13)  HR: 67 (14 Jun 2019 11:54) (52 - 71)  BP: 127/68 (14 Jun 2019 11:54) (118/69 - 139/83)  BP(mean): --  RR: 16 (14 Jun 2019 11:54) (16 - 20)  SpO2: 93% (14 Jun 2019 11:54) (93% - 97%)        PHYSICAL EXAM:    General: Well developed; well nourished; in no acute distress  Eyes: PERRL, EOM intact; conjunctiva and sclera clear  ENMT: No nasal discharge; airway clear  Neck: Supple; non tender; no masses  Respiratory: Clear to auscultation bilaterally without wheezing, rhonchi or rales  Cardiovascular: Regular rate and rhythm. S1 and S2 Normal; No murmurs, gallops or rubs  Gastrointestinal: Soft non-tender non-distended; Normal bowel sounds  Extremities: No clubbing, cyanosis or edema  Neurological: Alert and oriented x3  Skin: Warm and dry. No obvious rash    LABS:      CBC Full  -  ( 14 Jun 2019 08:46 )  WBC Count : 4.80 K/uL  RBC Count : 4.97 M/uL  Hemoglobin : 14.7 g/dL  Hematocrit : 43.9 %  Platelet Count - Automated : 216 K/uL  Mean Cell Volume : 88.3 fl  Mean Cell Hemoglobin : 29.6 pg  Mean Cell Hemoglobin Concentration : 33.5 gm/dL  Auto Neutrophil # : 2.75 K/uL  Auto Lymphocyte # : 1.45 K/uL  Auto Monocyte # : 0.49 K/uL  Auto Eosinophil # : 0.07 K/uL  Auto Basophil # : 0.02 K/uL  Auto Neutrophil % : 57.3 %  Auto Lymphocyte % : 30.2 %  Auto Monocyte % : 10.2 %  Auto Eosinophil % : 1.5 %  Auto Basophil % : 0.4 %    06-14    138  |  104  |  16  ----------------------------<  114<H>  3.5   |  25  |  0.68    Ca    9.0      14 Jun 2019 08:46  Mg     2.2     06-13            PFT  FVC 3.21 (67%)-->3.39 (71%)  FEV1 2.85 (79%) --> 2.92 (81%)  FEV1/FVC 89% --> 86%  TLC 80%  DLCO 68%    HAST - Able to maintain SpO2 of 95% for 20 minutes with FiO2 15% to simulate cabin pressure of 8000 ft.             RADIOLOGY & ADDITIONAL STUDIES (The following images were personally reviewed):
Interval Events: QUIN    Patient seen and examined at bedside. Breathing feels back to baseline, no fever/chills, no chest pain, no cough.           Allergies    No Known Allergies    Intolerances        Vital Signs Last 24 Hrs  T(C): 36.7 (13 Jun 2019 08:33), Max: 36.8 (12 Jun 2019 20:23)  T(F): 98 (13 Jun 2019 08:33), Max: 98.3 (12 Jun 2019 20:23)  HR: 59 (13 Jun 2019 08:33) (57 - 78)  BP: 151/97 (13 Jun 2019 08:33) (118/71 - 151/97)  BP(mean): --  RR: 20 (13 Jun 2019 08:33) (17 - 20)  SpO2: 95% (13 Jun 2019 08:33) (95% - 97%)        PHYSICAL EXAM:    General: Well developed; well nourished; in no acute distress  Eyes: PERRL, EOM intact; conjunctiva and sclera clear  Respiratory: Clear to auscultation bilaterally without wheezing, rhonchi or rales  Cardiovascular: Regular rate and rhythm. S1 and S2 Normal; No murmurs, gallops or rubs  Gastrointestinal: Soft non-tender non-distended; Normal bowel sounds  Extremities: , No clubbing, cyanosis or edema  Neurological: Alert and oriented x3  Skin: Warm and dry. No obvious rash    LABS:      CBC Full  -  ( 13 Jun 2019 07:54 )  WBC Count : 3.71 K/uL  RBC Count : 4.94 M/uL  Hemoglobin : 14.7 g/dL  Hematocrit : 44.3 %  Platelet Count - Automated : 198 K/uL  Mean Cell Volume : 89.7 fl  Mean Cell Hemoglobin : 29.8 pg  Mean Cell Hemoglobin Concentration : 33.2 gm/dL  Auto Neutrophil # : 1.43 K/uL  Auto Lymphocyte # : 1.66 K/uL  Auto Monocyte # : 0.47 K/uL  Auto Eosinophil # : 0.12 K/uL  Auto Basophil # : 0.02 K/uL  Auto Neutrophil % : 38.6 %  Auto Lymphocyte % : 44.7 %  Auto Monocyte % : 12.7 %  Auto Eosinophil % : 3.2 %  Auto Basophil % : 0.5 %    06-13    140  |  102  |  15  ----------------------------<  98  4.2   |  29  |  0.91    Ca    9.3      13 Jun 2019 07:54  Mg     2.2     06-13    TPro  6.5  /  Alb  3.8  /  TBili  0.7  /  DBili  x   /  AST  22  /  ALT  39  /  AlkPhos  84  06-12    PT/INR - ( 12 Jun 2019 07:08 )   PT: 11.8 sec;   INR: 1.04          PTT - ( 12 Jun 2019 07:08 )  PTT:31.9 sec              Culture - Bronchial (06.12.19 @ 16:34)    Gram Stain:   Moderate Gram positive cocci in pairs  Few Gram Positive Rods  Rare Gram Negative Rods  Moderate WBC's  Moderate epithelial cells (bronchial cells)    Specimen Source: Bronch Wash broncial wash    Culture Results:   Culture in progress    Culture - Bronchial (06.12.19 @ 16:22)    Gram Stain:   Moderate Gram Positive Cocci in Pairs and Chains  Moderate WBC's  Few epithelial cells    Specimen Source: Lavage lingula bal    Culture Results:   Culture in progress    Cell Count, Body Fluid (06.12.19 @ 15:42)    Eosinophil Count - Body Fluid: 3 %    Monocyte/Macrophage Count - Body Fluid: 4 %    Fluid Segmented Granulocytes: 5 %    Fluid Source: Bronchoalveolar Lavage (BAL)    Fluid Type: Bronchoalveolar Lavage (BAL)    Comment - Fluids: Differential is based on 32 _ cells counted after cytocentrifugation.    Body Fluid Appearance: Slightly Hazy    BF Lymphocytes: 20 %    Tube Type: SCREWTOP    Color - Body Fluid: No Color    Total Nucleated Cell Count, Body Fluid: 29 /uL    Total RBC Count: 54 /uL        RADIOLOGY & ADDITIONAL STUDIES (The following images were personally reviewed):
Interval Events: QUIN  Patient seen and examined at bedside. Continues to feel improvement in breathing, though has not ambulated further than the bathroom since yesterday. No cough, chest pain, fevers/chills, congestion.         MEDICATIONS:  Pulmonary:    Antimicrobials:    Anticoagulants:  heparin  Injectable 7500 Unit(s) SubCutaneous every 8 hours    Cardiac:      Allergies    No Known Allergies    Intolerances        Vital Signs Last 24 Hrs  T(C): 36.9 (12 Jun 2019 08:59), Max: 36.9 (12 Jun 2019 08:59)  T(F): 98.5 (12 Jun 2019 08:59), Max: 98.5 (12 Jun 2019 08:59)  HR: 65 (12 Jun 2019 08:59) (62 - 76)  BP: 121/80 (12 Jun 2019 08:59) (121/80 - 146/90)  BP(mean): --  RR: 18 (12 Jun 2019 08:59) (18 - 18)  SpO2: 94% (12 Jun 2019 08:59) (93% - 97%)        PHYSICAL EXAM:    General: Obese man; in no acute distress  Respiratory: Clear to auscultation bilaterally without wheezing, rhonchi or rales  Cardiovascular: Regular rate and rhythm. S1 and S2 Normal; No murmurs, gallops or rubs  Gastrointestinal: Soft non-tender non-distended; Normal bowel sounds  Extremities: No clubbing, cyanosis or edema  Neurological: Alert and oriented x3  Skin: Warm and dry. No obvious rash    LABS:  ABG - ( 10 Edy 2019 20:00 )  pH, Arterial: 7.46  pH, Blood: x     /  pCO2: 37    /  pO2: 67    / HCO3: 25    / Base Excess: 1.8   /  SaO2: 93                  CBC Full  -  ( 12 Jun 2019 07:08 )  WBC Count : 2.67 K/uL  RBC Count : 4.77 M/uL  Hemoglobin : 14.4 g/dL  Hematocrit : 42.8 %  Platelet Count - Automated : 185 K/uL  Mean Cell Volume : 89.7 fl  Mean Cell Hemoglobin : 30.2 pg  Mean Cell Hemoglobin Concentration : 33.6 gm/dL    06-12    142  |  105  |  16  ----------------------------<  108<H>  3.8   |  28  |  0.78    Ca    9.3      12 Jun 2019 07:08  Mg     2.0     06-12    TPro  6.5  /  Alb  3.8  /  TBili  0.7  /  DBili  x   /  AST  22  /  ALT  39  /  AlkPhos  84  06-12    PT/INR - ( 12 Jun 2019 07:08 )   PT: 11.8 sec;   INR: 1.04          PTT - ( 12 Jun 2019 07:08 )  PTT:31.9 sec                  RADIOLOGY & ADDITIONAL STUDIES (The following images were personally reviewed):  < from: CT Chest No Cont (06.11.19 @ 15:52) >  Impression:  1. Diffuse bilateral groundglass changes on expiratory phase examination   although much less predominant auditory scanning, with inspiratory exam   showing mild groundglass pulmonary change predominantly within upper   lobes, and much lesser extent within the lower lobes  2. No pulmonary nodules or masses. No pleural effusions  3. Incidental coronary artery calcifications. Minimal pericardial effusion    < end of copied text >
OVERNIGHT EVENTS: QUIN    SUBJECTIVE: Patient did not sleep well last night again. said there was a lot of commotion with his roommate so that kept him up. He was on cpap for a small amount of time then was placed on NC bc machine was beeping. No reports of fast heart rate, palpitations, cough, or SOB.    Vital Signs Last 12 Hrs  T(F): 98 (06-13-19 @ 08:33), Max: 98.3 (06-13-19 @ 05:14)  HR: 59 (06-13-19 @ 08:33) (57 - 59)  BP: 151/97 (06-13-19 @ 08:33) (132/74 - 151/97)  BP(mean): --  RR: 20 (06-13-19 @ 08:33) (20 - 20)  SpO2: 95% (06-13-19 @ 08:33) (95% - 97%)  I&O's Summary      PHYSICAL EXAM:  Constitutional: NAD, comfortable in bed.  HEENT: NC/AT, PERRLA, EOMI, no conjunctival pallor or scleral icterus, MMM  Neck: Supple, no JVD  Respiratory: Normal rate, rhythm, depth, effort. mild fine rales at bases bilaterally, but improved from prior.  Cardiovascular: RRR, normal S1 and S2, no m/r/g.   Gastrointestinal: +BS, soft NTND, no guarding or rebound tenderness, no palpable masses   Extremities: wwp; no cyanosis, clubbing or edema.   Vascular: Pulses equal and strong throughout.   Neurological: AAOx3, no CN deficits, strength and sensation intact throughout.   Skin: No gross skin abnormalities or rashes        LABS:                        14.7   3.71  )-----------( 198      ( 13 Jun 2019 07:54 )             44.3     06-13    140  |  102  |  15  ----------------------------<  98  4.2   |  29  |  0.91    Ca    9.3      13 Jun 2019 07:54  Mg     2.2     06-13    TPro  6.5  /  Alb  3.8  /  TBili  0.7  /  DBili  x   /  AST  22  /  ALT  39  /  AlkPhos  84  06-12    PT/INR - ( 12 Jun 2019 07:08 )   PT: 11.8 sec;   INR: 1.04          PTT - ( 12 Jun 2019 07:08 )  PTT:31.9 sec      RADIOLOGY & ADDITIONAL TESTS:    MEDICATIONS  (STANDING):  atorvastatin 10 milliGRAM(s) Oral at bedtime  heparin  Injectable 7500 Unit(s) SubCutaneous every 8 hours  melatonin 5 milliGRAM(s) Oral at bedtime  PARoxetine 20 milliGRAM(s) Oral daily    MEDICATIONS  (PRN):
OVERNIGHT EVENTS: QUIN    SUBJECTIVE: Patient states he did not sleep well last night because he was woken up many times. Feels breathing is OK with the nasal canula. Currently on 3L satting 96%.    Vital Signs Last 12 Hrs  T(F): 98.1 (06-12-19 @ 05:02), Max: 98.1 (06-12-19 @ 05:02)  HR: 62 (06-12-19 @ 05:02) (62 - 76)  BP: 122/83 (06-12-19 @ 05:02) (122/83 - 146/90)  BP(mean): --  RR: 18 (06-12-19 @ 05:02) (18 - 18)  SpO2: 95% (06-12-19 @ 05:02) (93% - 95%)  I&O's Summary      PHYSICAL EXAM:  Constitutional: NAD, comfortable in bed.  HEENT: NC/AT, PERRLA, EOMI, no conjunctival pallor or scleral icterus, MMM  Neck: Supple, no JVD  Respiratory: Normal rate, rhythm, depth, effort. nasal canula on 3L. rales at bases to midlung, R>L. otherwise clear  Cardiovascular: RRR, normal S1 and S2, no m/r/g.   Gastrointestinal: +BS, soft NTND, no guarding or rebound tenderness, no palpable masses   Extremities: wwp; no cyanosis, clubbing or edema.   Vascular: Pulses equal and strong throughout.   Neurological: AAOx3, no CN deficits, strength and sensation intact throughout.   Skin: No gross skin abnormalities or rashes        LABS:                        14.4   2.67  )-----------( 185      ( 12 Jun 2019 07:08 )             42.8     06-12    142  |  105  |  16  ----------------------------<  108<H>  3.8   |  28  |  0.78    Ca    9.3      12 Jun 2019 07:08  Mg     2.0     06-12    TPro  6.5  /  Alb  3.8  /  TBili  0.7  /  DBili  x   /  AST  22  /  ALT  39  /  AlkPhos  84  06-12    PT/INR - ( 12 Jun 2019 07:08 )   PT: 11.8 sec;   INR: 1.04          PTT - ( 12 Jun 2019 07:08 )  PTT:31.9 sec      RADIOLOGY & ADDITIONAL TESTS:    MEDICATIONS  (STANDING):  heparin  Injectable 7500 Unit(s) SubCutaneous every 8 hours  melatonin 5 milliGRAM(s) Oral at bedtime  PARoxetine 20 milliGRAM(s) Oral daily    MEDICATIONS  (PRN):

## 2019-06-14 NOTE — DIETITIAN INITIAL EVALUATION ADULT. - OTHER INFO
59M with PMH depression, PUD, psoriatic arthritis, CUAUHTEMOC on CPAP with poor compliance, presents with acute onset of SOB. Pt is visiting from [place] and states he has noted over the last few months intermittent episodes of SOB with exertion. Reports gasping for breath at night when wakes up 3-4 years. Now s/p bronch showing likely ILD. Pt now feeling improved, pending O2 test results to determine need for O2 on flight back home to [place], possible DC prior to weekend. No noted n/v/d/c, skin is intact, no issues chewing/ swallowing, denies pain. Intake noted to be good - fair at this time. NKFA or changes in wt noted. Encouraged intake through day to meet needs, will continue to follow per protocol.

## 2019-06-14 NOTE — DIETITIAN INITIAL EVALUATION ADULT. - PROBLEM SELECTOR PLAN 5
#Diarrhea  Patient reports chronic diarrhea for many years, reports he has loose-watery stools every 1 week or so, never got worked up because he hasn't been able to take stool samples to PMD  - f/u GI PCR  - continue to monitor    ADDENDUM: last episode of loose stools was today, will obtain GI PCR

## 2019-06-14 NOTE — PROGRESS NOTE ADULT - PROBLEM SELECTOR PROBLEM 3
CUAUHTEMOC (obstructive sleep apnea)

## 2019-06-14 NOTE — DIETITIAN INITIAL EVALUATION ADULT. - PROBLEM SELECTOR PLAN 2
Likely associated with viral illness. Patient with palpitations, reports HR 140s on his Fitbit, associated with having subjective fevers. PE unlikely given negative imaging; EKG negative for arrhythmia  -f/u TSH  -CTPA negative for PE  -currently HR 70-90s  -f/u ECHO  -Trops -ve x 1, f/u repeat Trop

## 2019-06-14 NOTE — PROGRESS NOTE ADULT - ASSESSMENT
60 yo M with PMH psoriatic arthritis, CUAUHTEMOC, presents with acute on chronic IVAN, palpitations, associated with hypoxia and diffuse ground glass opacities on CT
60 yo M with a PMH of depression, Arthritis (was on Naproxen, c/b PUD and was taken off meds, s/p normal EGD 4 months ago), CUAUHTEMOC (intermittently on CPAP) who presents with acute onset of IVAN x 2 days associated with palpitations and subjective fevers with recent travels and sick contacts, found to be desatting to 90% on RA, and imaging findings of b/l infiltrates, admitted to [facility] for further workup
58 yo M with a PMH of depression, Arthritis (was on Naproxen, c/b PUD and was taken off meds, s/p normal EGD 4 months ago), CUAUHTEMOC (intermittently on CPAP) who presents with acute onset of IVAN x 2 days associated with palpitations and subjective fevers with recent travels and sick contacts, found to be desatting to 90% on RA, and imaging findings of b/l infiltrates, admitted to [facility] for further workup
60 yo M with PMH psoriatic arthritis, CUAUHTEMOC, presents with acute on chronic IVAN, palpitations, associated with hypoxia and diffuse ground glass opacities on CT
60 yo M with PMH psoriatic arthritis, CUAUHTEMOC, presents with acute on chronic IVAN, palpitations, associated with hypoxia and diffuse ground glass opacities on CT
60 yo M with a PMH of depression, Arthritis (was on Naproxen, c/b PUD and was taken off meds, s/p normal EGD 4 months ago), CUAUHTEMOC (intermittently on CPAP) who presents with acute onset of IVAN x 2 days associated with palpitations and subjective fevers with recent travels and sick contacts, found to be desatting to 90% on RA, and imaging findings of b/l infiltrates, admitted to [facility] for further workup

## 2019-06-14 NOTE — DIETITIAN INITIAL EVALUATION ADULT. - ENERGY NEEDS
Ideal body weight used for calculations as pt >120% of IBW.   ABW 136kg, IBW  72kg, 188% IBW, ht 69", BMI 43.4   Nutrient needs based on [facility] standards of care for maintenance in adults.

## 2019-06-14 NOTE — PROGRESS NOTE ADULT - PROBLEM SELECTOR PLAN 2
- 2% associated with ILD, normal ESR and CRP mildly elevated, doubt exacerbation of collagen vascular disease

## 2019-06-14 NOTE — DIETITIAN INITIAL EVALUATION ADULT. - PROBLEM SELECTOR PLAN 6
ADDENDUM: reports no longer having psoriatic lesions; re: arthritis ,  off naprosyn 2/2 PUD; sxs controlled, limited to knuckles, wrists and hips b/l; apap prn

## 2019-06-18 LAB
A FUMIGATUS IGG SER QL: 6.1 MCG/ML — SIGNIFICANT CHANGE UP
ALTERNARIA TENUIS/ALTERNATA IGG: <2 MCG/ML — SIGNIFICANT CHANGE UP
AUREOBASIDIUM PULLULANS IGG: <2 MCG/ML — SIGNIFICANT CHANGE UP
MICROPOLYSPORA FAENI IGG: <2 MCG/ML — SIGNIFICANT CHANGE UP
PENICILLIUM CHRYSOGENUM/NOTATUM IGG: 5.4 MCG/ML — SIGNIFICANT CHANGE UP
PHOMA BETAE IGG: <2 MCG/ML — SIGNIFICANT CHANGE UP
T VULGARIS IGG SER QL: <2 MCG/ML — SIGNIFICANT CHANGE UP
TRICHODERMA VIRIDE IGG: <2 MCG/ML — SIGNIFICANT CHANGE UP

## 2019-06-19 ENCOUNTER — APPOINTMENT (OUTPATIENT)
Dept: PULMONOLOGY | Facility: CLINIC | Age: 59
End: 2019-06-19
Payer: SELF-PAY

## 2019-06-19 VITALS
WEIGHT: 298 LBS | HEIGHT: 69.69 IN | OXYGEN SATURATION: 94 % | HEART RATE: 96 BPM | SYSTOLIC BLOOD PRESSURE: 140 MMHG | BODY MASS INDEX: 43.15 KG/M2 | TEMPERATURE: 98.4 F | DIASTOLIC BLOOD PRESSURE: 70 MMHG | RESPIRATION RATE: 12 BRPM

## 2019-06-19 DIAGNOSIS — Z87.2 PERSONAL HISTORY OF DISEASES OF THE SKIN AND SUBCUTANEOUS TISSUE: ICD-10-CM

## 2019-06-19 DIAGNOSIS — Z78.9 OTHER SPECIFIED HEALTH STATUS: ICD-10-CM

## 2019-06-19 DIAGNOSIS — J67.9 HYPERSENSITIVITY PNEUMONITIS DUE TO UNSPECIFIED ORGANIC DUST: ICD-10-CM

## 2019-06-19 DIAGNOSIS — Z83.3 FAMILY HISTORY OF DIABETES MELLITUS: ICD-10-CM

## 2019-06-19 PROCEDURE — 99214 OFFICE O/P EST MOD 30 MIN: CPT

## 2019-06-19 RX ORDER — PREDNISONE 20 MG/1
20 TABLET ORAL
Qty: 90 | Refills: 0 | Status: ACTIVE | COMMUNITY

## 2019-06-19 NOTE — PHYSICAL EXAM
[Normal Appearance] : normal appearance [General Appearance - Well Developed] : well developed [Well Groomed] : well groomed [General Appearance - Well Nourished] : well nourished [General Appearance - In No Acute Distress] : no acute distress [No Deformities] : no deformities [Eyelids - No Xanthelasma] : the eyelids demonstrated no xanthelasmas [Normal Conjunctiva] : the conjunctiva exhibited no abnormalities [Normal Oropharynx] : normal oropharynx [Neck Cervical Mass (___cm)] : no neck mass was observed [Neck Appearance] : the appearance of the neck was normal [Jugular Venous Distention Increased] : there was no jugular-venous distention [Thyroid Diffuse Enlargement] : the thyroid was not enlarged [Thyroid Nodule] : there were no palpable thyroid nodules [Heart Rate And Rhythm] : heart rate and rhythm were normal [Heart Sounds] : normal S1 and S2 [Murmurs] : no murmurs present [Exaggerated Use Of Accessory Muscles For Inspiration] : no accessory muscle use [Respiration, Rhythm And Depth] : normal respiratory rhythm and effort [Abdomen Soft] : soft [Auscultation Breath Sounds / Voice Sounds] : lungs were clear to auscultation bilaterally [Abdomen Tenderness] : non-tender [Abnormal Walk] : normal gait [Abdomen Mass (___ Cm)] : no abdominal mass palpated [Gait - Sufficient For Exercise Testing] : the gait was sufficient for exercise testing [Nail Clubbing] : no clubbing of the fingernails [Cyanosis, Localized] : no localized cyanosis [Petechial Hemorrhages (___cm)] : no petechial hemorrhages [Skin Color & Pigmentation] : normal skin color and pigmentation [] : no rash [Skin Turgor] : normal skin turgor [No Focal Deficits] : no focal deficits [Sensation] : the sensory exam was normal to light touch and pinprick [Deep Tendon Reflexes (DTR)] : deep tendon reflexes were 2+ and symmetric [Impaired Insight] : insight and judgment were intact [Oriented To Time, Place, And Person] : oriented to person, place, and time [Affect] : the affect was normal

## 2019-06-20 NOTE — PROCEDURE
[FreeTextEntry1] : Full PFTs performed in the office. \par \par Normal spiormetry. \par Mild restrictive defect \par Mild reduction in DLCO.

## 2019-06-20 NOTE — ASSESSMENT
[FreeTextEntry1] : Patient with recently diagnosed HP with bronch data supporting diagnosis. He was started on prednisone and improved greatly. denies any limited in activity. \par \par PFTs today rather unremarkable. Resting SpO2 94 , increased from 87 on presentation to ED. \par \par Plan: \par - Will decrease prednisone to 50mg Qday\par - Patient safe to fly and travel given negative high altitude simulation test\par - Recommend pulmonology follow up once in home country.

## 2019-06-20 NOTE — REASON FOR VISIT
[Initial Evaluation] : an initial evaluation [ILD] : ILD [FreeTextEntry2] : Hypersensitivity pneumonitis

## 2019-06-20 NOTE — HISTORY OF PRESENT ILLNESS
[FreeTextEntry1] : 59 year old male with pmh of psoriatic arthritis ( not on treatment currently ) who is here for follow up after hospital discharge. Patient is traveling from [place] and developed progressive SOB with exertion. He reports being able to walk up to 15,000 steps just a few weeks ago. During his admission he underwent a CT that showed mosaicism suspicious for HP. He underwent a bronchoscopy which showed lymphocyte predominance. He was started on prednisone and improved. He also underwent a high altitude simulation test which was negative. \par He reports having some pink mold at home, as well as a natural wool rug. 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 \par \par Since discharge he feels much better, able to walk without limitaitons.

## 2021-11-30 NOTE — PROGRESS NOTE ADULT - NSHPATTENDINGPLANDISCUSS_GEN_ALL_CORE
Detail Level: Detailed
resident team and patient.